# Patient Record
Sex: FEMALE | Race: WHITE | NOT HISPANIC OR LATINO | Employment: FULL TIME | URBAN - METROPOLITAN AREA
[De-identification: names, ages, dates, MRNs, and addresses within clinical notes are randomized per-mention and may not be internally consistent; named-entity substitution may affect disease eponyms.]

---

## 2024-07-18 ENCOUNTER — DOCUMENTATION (OUTPATIENT)
Dept: SURGERY | Facility: CLINIC | Age: 29
End: 2024-07-18

## 2024-07-18 NOTE — PROGRESS NOTES
Initial bariatric nurse interview completed. Discussed with patient program progression/expectations along with program requirements and necessary clearances. Explained to patient they must travel with support person to all visits. Went over initial evaluation visit: labwork, appointment with Dr. Mccollum, appointment with dietician, and appointment with psych. Explained patient will be in area for approximately 2 days for evaluation visit and 9 days surrounding WLS at a local hotel. Patient aware of the $6850 Total OOP maximum, $930 for Initial evaluation visit if OOP not met. Any amount owed must be paid within 10 business days of visits. Patient verbalized understanding to all. All questions answered. Scheduled initial visit for 8/19/2024. Emailed New Patient Handbook including assessment forms & contracts.

## 2024-07-23 PROBLEM — Z01.818 PREOPERATIVE CLEARANCE: Status: ACTIVE | Noted: 2024-07-23

## 2024-07-23 PROBLEM — Z98.84 BARIATRIC SURGERY STATUS: Status: ACTIVE | Noted: 2024-07-23

## 2024-07-23 PROBLEM — E66.01 MORBID OBESITY (MULTI): Status: ACTIVE | Noted: 2024-07-23

## 2024-08-19 ENCOUNTER — APPOINTMENT (OUTPATIENT)
Dept: SURGERY | Facility: CLINIC | Age: 29
End: 2024-08-19
Payer: COMMERCIAL

## 2024-08-19 ENCOUNTER — APPOINTMENT (OUTPATIENT)
Dept: BEHAVIORAL HEALTH | Facility: HOSPITAL | Age: 29
End: 2024-08-19

## 2024-08-20 NOTE — PROGRESS NOTES
Subjective   Date: 9/9/2024 Time: 8:45 AM  Name: Mia Velazquez  MRN: [id number]    This is a 29 y.o. female with morbid obesity, BMI ~ 47 who presents to clinic for consideration of bariatric surgery. she has attempted and failed multiple diet and exercise regimens for weight loss. Initial Onset of obesity was in childhood.  Their goal for surgery is to  be healthier , be candidate for another surgery , lose weight, and in 2-3 years would like to have IVF . The patient has tried multiple diets to lose weight including Crespo, Weight Watchers, Low Calorie, and high protein . The patient was most successful with the Atkins diet. The most pounds lost on this diet were 40 lbs. The patient considers their dietary weakness to be carbs The patient reports a  highest weight ever of 313 pounds and lowest weight ever of 265 poundsThe patient exercises 2-5 times /week  30 minutes/session  Types of Exercise : walking    PREFERRED SURGICAL PROCEDURE: Laparoscopic SLEEVE Gastric Surgery For Morbid Obesity Laparoscopic Longitudinal Gastrectomy     Trying to have pregnancy for 1 year.  Can't do anesthesia for ivf for mi over 40.  Wants to avoid compications.       PMH:   Past Medical History:   Diagnosis Date    Diabetes (Multi)     Epilepsy (Multi)     Nonalcoholic fatty liver disease     PCOS (polycystic ovarian syndrome)     Snores         PSH:   Past Surgical History:   Procedure Laterality Date    CHOLECYSTECTOMY  2016    TONSILLECTOMY  2012    WISDOM TOOTH EXTRACTION  2017        UNK: personal/family hx of VTE.    Grtandfather prediabetic andhigh cholesteroll    GERD - Health Related Quality of Life Questionnaire (GERD- HRQL)    Off PPI for never (how long)    No heartburn or reflux  On ozempic and lost 30 lbs  Notes symptoms if eats wrong things.   Exercise with [employer]: no gym so uses videos provided by [employer]      FAMILY HISTORY:  Family History   Problem Relation Name Age of Onset    Heart attack Maternal Grandfather       Heart disease Maternal Grandfather      Stroke Maternal Grandfather          SOCIAL HISTORY:  Social History     Tobacco Use    Smoking status: Never     Passive exposure: Current (9/9/24:  Pt verifies verbally parents smoked in home growing up, [facility]    Smokeless tobacco: Never    Tobacco comments:     9/9/24:  Pt verifies verbally [facility] LPN   Substance Use Topics    Alcohol use: Not Currently     Comment: 9/9/24:  Pt verifies verbally [facility] LPN    Drug use: Not Currently     Comment: 9/9/24:  Pt verifies verbally [facility] LPN       MEDICATIONS:  Prior to Admission Medications:  Medication Documentation Review Audit       Reviewed by Ayaka Mccollum MD MPH (Physician) on 09/09/24 at 0844      Medication Order Taking? Sig Documenting Provider Last Dose Status     Discontinued 09/09/24 0811     Discontinued 09/09/24 0811   semaglutide (Ozempic) 1 mg/dose (2 mg/1.5 mL) pen injector [id number] Yes Inject 1 mg under the skin 1 (one) time per week. Historical Provider, MD Taking Active                     ALLERGIES:  Allergies   Allergen Reactions    Penicillin Unknown   Ocp as well    REVIEW OF SYSTEMS:  GENERAL: Negative for malaise, significant weight loss and fever  HEAD: Negative for headache, swelling.  NECK: Negative for lumps, goiter, pain and significant neck swelling  RESPIRATORY: Negative for cough, wheezing or shortness of breath.  CARDIOVASCULAR: Negative for chest pain, leg swelling or palpitations.  GI: Negative for abdominal discomfort, blood in stools or black stools or change in bowel habits  : No history of dysuria, frequency or incontinence  MUSCULOSKELETAL: Negative for joint pain or swelling, back pain or muscle pain.  SKIN: Negative for lesions, rash, and itching.  PSYCH: Negative for sleep disturbance, mood disorder and recent psychosocial stressors.  ENDOCRINE: Negative for cold or heat intolerance, polyuria, polydipsia and goiter.    Objective   PHYSICAL EXAM:  Visit  "Vitals  /89   Pulse 86   Resp 18   Ht 1.651 m (5' 5\")   Wt 122 kg (268 lb 4.8 oz)   SpO2 98%   BMI 44.65 kg/m²   Smoking Status Never   BSA 2.37 m²     General appearance: obese, NAD  Neuro: AOx3  Head: EOMI; no swelling or lesions of scalp or face  ENT:  no lumps or lymphadenopathy, thyroid normal to palpation; oropharynx clear, no swelling or erythema  Skin: warm, no erythema or rashes  Lungs: clear to percussion and auscultation  Heart: regular rhythm and S1, S2 normal  Abdomen: soft, non-tender, no masses, no organomegaly  Extremities: Normal exam of the extremities. No swelling or pain.  Psych: no hurried speech, no flight of ideas, normal affect    IMPRESSION:  Mia Velazquez is a 29 y.o. female with a bmi of Body mass index is 44.65 kg/m². with the following diagnoses and co-morbidities: infertility and wants ivf.  She is not sure about sleeve or bypass.  Notes sweets are her weakness and likes ice cream.   She is alreaedy losing wieght with ozempic. She prefers the bypass.  We discussed the procedure at length. Her risk complication is low. Counseled on process to surgery.       We discussed the bypass  at [facility] and all questions were answered. risks and benefits were discussed  The patient understands the risks and benefits of the procedure and how the procedure is performed. The patient understands the risks include but are not limited to bleeding, infection, DVT, PE, pneumonia, myocardial infarction, leak along the staple lines, and weight regain. We discussed lifestyle changes necessary to be successful.      This patient does meet the criteria for a surgical weight loss procedure according to NIH guidelines.  The risks of sleeve gastrectomy, Rayo-en-Y gastric bypass, and duodenal switch surgery including bleeding, leak, wound infection, dehydration, ulcers, internal hernia, DVT/PE, prolonged nausea/vomiting, incomplete resolution of associated medical conditions, reflux, weight regain, " vitamin/mineral deficiencies, and death have been explained to the patient and Mia Velazquez has expressed understanding and acceptance of them.     The increased risk of substance and alcohol abuse following bariatric surgery was discussed with the patient, along with the negative consequences of substance/alcohol use after surgery including addiction, worsening of mental health disorders, and injury to the stomach. The risk of smoking and vaping (tobacco or any other substance) after bariatric surgery was explained to the patient. This includes risk of anastamotic ulcers, gastritis, bleeding, perforation, stricture, and PO intolerance.  The patient expressed understanding and acceptance of these risks.    The benefits of the above surgeries including weight loss, improvement/resolution of associated medical and mental health conditions, improved mobility, and decreased mortality have been explained the the patient and Mia Velazquez has expressed understanding and acceptance of them.      Assessment/Plan   PLAN:  The plan of treatment for Mia Velazquez is to continue with the consultations and tests ordered today in hopes of qualifying for pre-operative clearance for bariatric surgery. This includes: none    Consult Nutrition for education   Consult Psychology  Consult Cardiology  Consult Sleep Medicine - concern for NOHEMY  Labs completed today  Esophagram  Recommend at least 10 lbs of weight loss prior to surgery.  Additional consults/testing: none    PLANNED PROCEDURE: Laparoscopic Laparoscopic Rayo en y Gastric Bypass      The following are some lifestyle changes you should begin to prepare you for your bypass surgery.   Eliminate soda and other carbonated beverages from your diet. Carbonation will not be well tolerated after surgery. Try Propel, Vitamin Water Zero, Sobe Lifewater, Crystal Light or water.    Increase fluid consumption to 64 oz daily. Do not drink within 30 minutes of eating as this will liquefy  your food and make you hungry more quickly.    Exercise for 30-60 minutes daily. Brisk walking, bike riding and swimming are all examples of healthy exercise. If you are unable to exercise we recommend seated exercise.    Do not skip meals.    Take a multivitamin daily.    Lose weight. In preparation for your surgery it is important that you begin making healthier food choices now. Our dietitian will meet with you to help you select foods lower in calories and higher in nutrition. We would like you to lose at least 10  lbs prior to surgery.     Increase your protein intake to 60 grams per day.    Alcohol is empty calories. Please eliminate while preparing for surgery.    Plan your meals.      General Instruction: 1) Use the information we gave you today to work through your insurance requirements and medical clearances.   2) These documents need to get faxed to the program navigators so they can submit them for approval from your insurance company.   3) Obtain labs today at a  facility. We will call you with any abnormalities and corrections you need to make.   4) Continue to work with your primary care doctor and other specialist so your other health problems are well controlled prior to your surgery.   5) Adopt the recommendations of the program dietician so you develop healthy eating patterns.   6) Work with the sleep team to get your sleep apnea treated to prevent other health problems .   7) Consider attending a support group to learn from other who have been through the process.   8) Come to the [facility] sessions.   45 minutes were spent with patient including history, physical exam, and education.

## 2024-09-09 ENCOUNTER — APPOINTMENT (OUTPATIENT)
Dept: SURGERY | Facility: CLINIC | Age: 29
End: 2024-09-09
Payer: COMMERCIAL

## 2024-09-09 ENCOUNTER — OFFICE VISIT (OUTPATIENT)
Dept: BEHAVIORAL HEALTH | Facility: HOSPITAL | Age: 29
End: 2024-09-09
Payer: COMMERCIAL

## 2024-09-09 VITALS
RESPIRATION RATE: 18 BRPM | BODY MASS INDEX: 44.7 KG/M2 | HEIGHT: 65 IN | OXYGEN SATURATION: 98 % | SYSTOLIC BLOOD PRESSURE: 122 MMHG | WEIGHT: 268.3 LBS | HEART RATE: 86 BPM | DIASTOLIC BLOOD PRESSURE: 89 MMHG

## 2024-09-09 VITALS — WEIGHT: 268.96 LBS | HEIGHT: 65 IN | BODY MASS INDEX: 44.81 KG/M2

## 2024-09-09 DIAGNOSIS — Z98.84 BARIATRIC SURGERY STATUS: ICD-10-CM

## 2024-09-09 DIAGNOSIS — Z01.818 PREOPERATIVE CLEARANCE: ICD-10-CM

## 2024-09-09 DIAGNOSIS — F41.8 SITUATIONAL ANXIETY: ICD-10-CM

## 2024-09-09 DIAGNOSIS — E28.2 PCOS (POLYCYSTIC OVARIAN SYNDROME): ICD-10-CM

## 2024-09-09 DIAGNOSIS — E66.01 MORBID OBESITY (MULTI): ICD-10-CM

## 2024-09-09 LAB
25(OH)D3 SERPL-MCNC: 28 NG/ML (ref 30–100)
ALBUMIN SERPL BCP-MCNC: 4.2 G/DL (ref 3.4–5)
ALP SERPL-CCNC: 58 U/L (ref 33–110)
ALT SERPL W P-5'-P-CCNC: 16 U/L (ref 7–45)
AMPHETAMINES UR QL SCN: NORMAL
ANION GAP SERPL CALC-SCNC: 13 MMOL/L (ref 10–20)
APTT PPP: 32 SECONDS (ref 27–38)
AST SERPL W P-5'-P-CCNC: 14 U/L (ref 9–39)
BARBITURATES UR QL SCN: NORMAL
BASOPHILS # BLD AUTO: 0.04 X10*3/UL (ref 0–0.1)
BASOPHILS NFR BLD AUTO: 0.7 %
BENZODIAZ UR QL SCN: NORMAL
BILIRUB SERPL-MCNC: 0.2 MG/DL (ref 0–1.2)
BUN SERPL-MCNC: 15 MG/DL (ref 6–23)
BZE UR QL SCN: NORMAL
CALCIUM SERPL-MCNC: 9.5 MG/DL (ref 8.6–10.3)
CANNABINOIDS UR QL SCN: NORMAL
CHLORIDE SERPL-SCNC: 105 MMOL/L (ref 98–107)
CHOLEST SERPL-MCNC: 169 MG/DL (ref 0–199)
CHOLESTEROL/HDL RATIO: 3.6
CO2 SERPL-SCNC: 26 MMOL/L (ref 21–32)
CREAT SERPL-MCNC: 0.7 MG/DL (ref 0.5–1.05)
EGFRCR SERPLBLD CKD-EPI 2021: >90 ML/MIN/1.73M*2
EOSINOPHIL # BLD AUTO: 0.38 X10*3/UL (ref 0–0.7)
EOSINOPHIL NFR BLD AUTO: 6.3 %
ERYTHROCYTE [DISTWIDTH] IN BLOOD BY AUTOMATED COUNT: 13.2 % (ref 11.5–14.5)
EST. AVERAGE GLUCOSE BLD GHB EST-MCNC: 105 MG/DL
FENTANYL+NORFENTANYL UR QL SCN: NORMAL
FERRITIN SERPL-MCNC: 37 NG/ML (ref 8–150)
FOLATE SERPL-MCNC: 6.9 NG/ML
GLUCOSE SERPL-MCNC: 87 MG/DL (ref 74–99)
HBA1C MFR BLD: 5.3 %
HCT VFR BLD AUTO: 41.9 % (ref 36–46)
HDLC SERPL-MCNC: 46.6 MG/DL
HGB BLD-MCNC: 13.5 G/DL (ref 12–16)
IMM GRANULOCYTES # BLD AUTO: 0.02 X10*3/UL (ref 0–0.7)
IMM GRANULOCYTES NFR BLD AUTO: 0.3 % (ref 0–0.9)
INR PPP: 0.9 (ref 0.9–1.1)
IRON SATN MFR SERPL: 11 % (ref 25–45)
IRON SERPL-MCNC: 48 UG/DL (ref 35–150)
LDLC SERPL CALC-MCNC: 86 MG/DL
LYMPHOCYTES # BLD AUTO: 2.23 X10*3/UL (ref 1.2–4.8)
LYMPHOCYTES NFR BLD AUTO: 36.7 %
MCH RBC QN AUTO: 29.7 PG (ref 26–34)
MCHC RBC AUTO-ENTMCNC: 32.2 G/DL (ref 32–36)
MCV RBC AUTO: 92 FL (ref 80–100)
METHADONE UR QL SCN: NORMAL
MONOCYTES # BLD AUTO: 0.35 X10*3/UL (ref 0.1–1)
MONOCYTES NFR BLD AUTO: 5.8 %
NEUTROPHILS # BLD AUTO: 3.06 X10*3/UL (ref 1.2–7.7)
NEUTROPHILS NFR BLD AUTO: 50.2 %
NON HDL CHOLESTEROL: 122 MG/DL (ref 0–149)
NRBC BLD-RTO: 0 /100 WBCS (ref 0–0)
OPIATES UR QL SCN: NORMAL
OXYCODONE+OXYMORPHONE UR QL SCN: NORMAL
PCP UR QL SCN: NORMAL
PLATELET # BLD AUTO: 384 X10*3/UL (ref 150–450)
POTASSIUM SERPL-SCNC: 4.3 MMOL/L (ref 3.5–5.3)
PROT SERPL-MCNC: 7 G/DL (ref 6.4–8.2)
PROTHROMBIN TIME: 10.5 SECONDS (ref 9.8–12.8)
PTH-INTACT SERPL-MCNC: 44.1 PG/ML (ref 18.5–88)
RBC # BLD AUTO: 4.54 X10*6/UL (ref 4–5.2)
SODIUM SERPL-SCNC: 140 MMOL/L (ref 136–145)
T4 FREE SERPL-MCNC: 0.67 NG/DL (ref 0.61–1.12)
TIBC SERPL-MCNC: 451 UG/DL (ref 240–445)
TRIGL SERPL-MCNC: 180 MG/DL (ref 0–149)
TSH SERPL-ACNC: 2.54 MIU/L (ref 0.44–3.98)
UIBC SERPL-MCNC: 403 UG/DL (ref 110–370)
VIT B12 SERPL-MCNC: 404 PG/ML (ref 211–911)
VLDL: 36 MG/DL (ref 0–40)
WBC # BLD AUTO: 6.1 X10*3/UL (ref 4.4–11.3)

## 2024-09-09 PROCEDURE — 84443 ASSAY THYROID STIM HORMONE: CPT

## 2024-09-09 PROCEDURE — 80307 DRUG TEST PRSMV CHEM ANLYZR: CPT

## 2024-09-09 PROCEDURE — 84425 ASSAY OF VITAMIN B-1: CPT

## 2024-09-09 PROCEDURE — 96136 PSYCL/NRPSYC TST PHY/QHP 1ST: CPT | Performed by: PSYCHOLOGIST

## 2024-09-09 PROCEDURE — 80053 COMPREHEN METABOLIC PANEL: CPT

## 2024-09-09 PROCEDURE — 82728 ASSAY OF FERRITIN: CPT

## 2024-09-09 PROCEDURE — 84439 ASSAY OF FREE THYROXINE: CPT

## 2024-09-09 PROCEDURE — 82746 ASSAY OF FOLIC ACID SERUM: CPT

## 2024-09-09 PROCEDURE — 99205 OFFICE O/P NEW HI 60 MIN: CPT | Performed by: SURGERY

## 2024-09-09 PROCEDURE — 90791 PSYCH DIAGNOSTIC EVALUATION: CPT | Performed by: PSYCHOLOGIST

## 2024-09-09 PROCEDURE — 3008F BODY MASS INDEX DOCD: CPT | Performed by: SURGERY

## 2024-09-09 PROCEDURE — 85730 THROMBOPLASTIN TIME PARTIAL: CPT

## 2024-09-09 PROCEDURE — 36415 COLL VENOUS BLD VENIPUNCTURE: CPT

## 2024-09-09 PROCEDURE — 96130 PSYCL TST EVAL PHYS/QHP 1ST: CPT | Performed by: PSYCHOLOGIST

## 2024-09-09 PROCEDURE — 85025 COMPLETE CBC W/AUTO DIFF WBC: CPT

## 2024-09-09 PROCEDURE — 82525 ASSAY OF COPPER: CPT

## 2024-09-09 PROCEDURE — 83540 ASSAY OF IRON: CPT

## 2024-09-09 PROCEDURE — 82306 VITAMIN D 25 HYDROXY: CPT

## 2024-09-09 PROCEDURE — 1036F TOBACCO NON-USER: CPT | Performed by: PSYCHOLOGIST

## 2024-09-09 PROCEDURE — 96130 PSYCL TST EVAL PHYS/QHP 1ST: CPT | Mod: AH | Performed by: PSYCHOLOGIST

## 2024-09-09 PROCEDURE — 82607 VITAMIN B-12: CPT

## 2024-09-09 PROCEDURE — 83036 HEMOGLOBIN GLYCOSYLATED A1C: CPT

## 2024-09-09 PROCEDURE — 85610 PROTHROMBIN TIME: CPT

## 2024-09-09 PROCEDURE — 83550 IRON BINDING TEST: CPT

## 2024-09-09 PROCEDURE — 83013 H PYLORI (C-13) BREATH: CPT

## 2024-09-09 PROCEDURE — 90791 PSYCH DIAGNOSTIC EVALUATION: CPT | Mod: AH | Performed by: PSYCHOLOGIST

## 2024-09-09 PROCEDURE — 80061 LIPID PANEL: CPT

## 2024-09-09 PROCEDURE — 1036F TOBACCO NON-USER: CPT | Performed by: SURGERY

## 2024-09-09 PROCEDURE — 83970 ASSAY OF PARATHORMONE: CPT

## 2024-09-09 PROCEDURE — 80323 ALKALOIDS NOS: CPT

## 2024-09-09 PROCEDURE — 96136 PSYCL/NRPSYC TST PHY/QHP 1ST: CPT | Mod: AH | Performed by: PSYCHOLOGIST

## 2024-09-09 PROCEDURE — 84630 ASSAY OF ZINC: CPT

## 2024-09-09 ASSESSMENT — ANXIETY QUESTIONNAIRES
6. BECOMING EASILY ANNOYED OR IRRITABLE: SEVERAL DAYS
IF YOU CHECKED OFF ANY PROBLEMS ON THIS QUESTIONNAIRE, HOW DIFFICULT HAVE THESE PROBLEMS MADE IT FOR YOU TO DO YOUR WORK, TAKE CARE OF THINGS AT HOME, OR GET ALONG WITH OTHER PEOPLE: NOT DIFFICULT AT ALL
3. WORRYING TOO MUCH ABOUT DIFFERENT THINGS: NEARLY EVERY DAY
4. TROUBLE RELAXING: NOT AT ALL
GAD7 TOTAL SCORE: 8
5. BEING SO RESTLESS THAT IT IS HARD TO SIT STILL: NOT AT ALL
1. FEELING NERVOUS, ANXIOUS, OR ON EDGE: SEVERAL DAYS
2. NOT BEING ABLE TO STOP OR CONTROL WORRYING: NEARLY EVERY DAY
7. FEELING AFRAID AS IF SOMETHING AWFUL MIGHT HAPPEN: NOT AT ALL

## 2024-09-09 ASSESSMENT — PAIN SCALES - GENERAL: PAINLEVEL: 0-NO PAIN

## 2024-09-09 ASSESSMENT — PATIENT HEALTH QUESTIONNAIRE - PHQ9
SUM OF ALL RESPONSES TO PHQ9 QUESTIONS 1 & 2: 0
1. LITTLE INTEREST OR PLEASURE IN DOING THINGS: NOT AT ALL
2. FEELING DOWN, DEPRESSED OR HOPELESS: NOT AT ALL

## 2024-09-09 ASSESSMENT — LIFESTYLE VARIABLES
HOW OFTEN DO YOU HAVE A DRINK CONTAINING ALCOHOL: NEVER
HOW MANY STANDARD DRINKS CONTAINING ALCOHOL DO YOU HAVE ON A TYPICAL DAY: PATIENT DOES NOT DRINK
HOW OFTEN DO YOU HAVE SIX OR MORE DRINKS ON ONE OCCASION: NEVER
AUDIT-C TOTAL SCORE: 0
AUDIT-C TOTAL SCORE: 0
SKIP TO QUESTIONS 9-10: 1

## 2024-09-09 NOTE — PROGRESS NOTES
"Initial Bariatric Nutrition Assessment    Surgeon:   Chun  Patient is considering: sleeve gastrectomy     ASSESSMENT:  Current weight:   Vitals:    09/09/24 1115   Weight: 122 kg (268 lb 15.4 oz)     Ht:  1.651 m (5' 5\")   BMI:  Body mass index is 44.76 kg/m².        Initial start weight:   280lbs  Pre-Op Excess Body Weight (EBW):   130lbs    Target Post-Op weight goal: 195.5-215lbs        This is a 29 y.o. female with morbid obesity (Body mass index is 44.76 kg/m².) who presents to clinic for consideration of bariatric surgery. she has attempted and failed multiple diet and exercise regimens for weight loss.    Initial Onset of obesity was in childhood.  Their goal for surgery is to  be healthier , be candidate for another surgery , lose weight, and in 2-3 years would like to have IVF . The patient has tried multiple diets to lose weight including Crespo, Weight Watchers, Low Calorie, and high protein . The patient was most successful with the Atkins diet. The most pounds lost on this diet were 40 lbs. The patient considers their dietary weakness to be carbs The patient reports a  highest weight ever of 313 pounds and lowest weight ever of 265 pounds.    Current diet: high protein, high veggies, low carb   The patient does home workout videos (cardio, Burn Along) 4x per week for 30-45 minutes for exercise.     Food allergies/intolerances:   no  Chewing/Swallowing/Dentition: no  Nausea / Vomiting / Hx Gastroparesis:  no  Diarrhea/ Constipation: w/greasy foods  Smoking/Tobacco use: no  Vitamins/Minerals supplements: prenatals   Hours of sleep/night: 6-8 hours     Medications:     Current Outpatient Medications:     semaglutide (Ozempic) 1 mg/dose (2 mg/1.5 mL) pen injector, Inject 1 mg under the skin 1 (one) time per week., Disp: , Rfl:       24 HOUR RECALL/DIET HISTORY:  Breakfast:  protein shake (50g)  Snack:    Lunch: skip  Snack:   Dinner: slices turkey (6 slices)  Snack:   Beverages: skim milk, water " (40-80oz)  Alcohol: no-never    Person responsible for cooking & shopping?   self  How often do you eat sweet snacks?   rarely  How often do you eat savory snacks?  never  How often do you eat out?   rarely  Do you feel overly stuffed?   no  Binge Eating?  no  Night Eating?  no  Emotional Eating?  no       READINESS TO LEARN:  Motivation to learn: Interested        Understanding of instruction: Good     Anticipated Compliance: Good       Family Support: yes           Educational Materials Provided:    Goals sheet    Nutrition assessment completed today.  Pt will be scheduled for video education class to discuss the 2 week pre op diet, post op protein and fluid goals, vitamin and mineral supplementation, exercise goals, and post op diet progression closer to the time of surgery    Patient is seeking sleeve gastrectomy    Instructed pt to eat 3 meals per and 1-2 high protein snacks.  Recommend eating 3-4 oz per meal. Reviewed the postop behaviors to start practicing.  Set a goal to maintain current exercise.     Patient was receptive to nutritional recommendations, asked numerous questions, and verbalized understanding of the weight loss surgery diet.  Patient expressed understanding about the importance of strict dietary compliance post-surgery to avoid nutritional deficiencies and achieve optimal weight loss and verbalized intent to follow dietary recommendations.    Malnutrition Screening:   Significant unintentional weight loss? n/a   Eating less than 75% of usual intake for more than 2 weeks? n/a      Nutrition Diagnosis:   Overweight/obesity related to excess energy intake as evidenced by BMI >= 40 kg/m^2.  Food- and nutrition-related knowledge deficit related to lack of prior exposure to surgical weight loss information as evidenced by pt new to surgical program.    Nutrition Interventions:   Modify type and amount of food and nutrients within meals and snacks.  Comprehensive Nutrition  Education    Recommendations:  1. Consider tracking your intake in the [employer] [place]anna.   Begin following your meal plan.  Measure and record intake daily.   2. Structure meal patterns, eating three meals and 1-2 snacks per day.  3. Aim for 3-4 oz (20g) protein per meal.  Have 1-2 high protein snacks that are 10-20 g protein each.  You can try a tuna or chicken packet, Greek yogurt, 2 string cheeses, Protein bars like Quest, Pure Protein, Premier, or Built Bars. you can also try protein chips form Quest or Atkins.    4. Drink 64oz of calorie-free, caffeine-free, and non-carbonated beverages. Practice taking sips and avoid straws.   5. Practice no drinking 30 minutes before meals, nothing with meals and wait 30 minutes after meals to drink again. Make meals last 30 minutes-chew thoroughly.   6. Limit or omit eating out/sweets/savory snacks to 1-2 times per week.  7. Continue daily multivitamin.   8. Continue home video exercises. Increase physical activity by 10-15 minutes as tolerated to an end goal of 60 minutes 5 x per week. Consistency is the key.    Pre-op Goal weight: lose 5% of body weight    Nutrition Monitoring and Evaluation: 1-2 pound weight loss per week  Criteria: weight check  Need for Follow-up: one month phone call check in    Patient does meet National Institutes Health guidelines for weight loss surgery, however needs to demonstrate consistent effort in making dietary changes before giving clearance. It is anticipated that the patient will need at least 1 nutritional follow-up visits prior to clearance for surgery.      Rhiannon Spicer MS, RD, LD  Phone: [phone]

## 2024-09-09 NOTE — PATIENT INSTRUCTIONS
PLAN:  The plan of treatment for Mia Velazquez is to continue with the consultations and tests ordered today in hopes of qualifying for pre-operative clearance for bariatric surgery. This includes: none    Consult Nutrition for education   Consult Psychology  Consult Cardiology  Consult Sleep Medicine - concern for NOHEMY  Labs completed today  Esophagram  Recommend at least 10 lbs of weight loss prior to surgery.  Additional consults/testing: none    PLANNED PROCEDURE: Laparoscopic Laparoscopic Rayo en y Gastric Bypass      The following are some lifestyle changes you should begin to prepare you for your bypass surgery.   Eliminate soda and other carbonated beverages from your diet. Carbonation will not be well tolerated after surgery. Try Propel, Vitamin Water Zero, Sobe Lifewater, Crystal Light or water.    Increase fluid consumption to 64 oz daily. Do not drink within 30 minutes of eating as this will liquefy your food and make you hungry more quickly.    Exercise for 30-60 minutes daily. Brisk walking, bike riding and swimming are all examples of healthy exercise. If you are unable to exercise we recommend seated exercise.    Do not skip meals.    Take a multivitamin daily.    Lose weight. In preparation for your surgery it is important that you begin making healthier food choices now. Our dietitian will meet with you to help you select foods lower in calories and higher in nutrition. We would like you to lose at least 10  lbs prior to surgery.     Increase your protein intake to 60 grams per day.    Alcohol is empty calories. Please eliminate while preparing for surgery.    Plan your meals.      General Instruction: 1) Use the information we gave you today to work through your insurance requirements and medical clearances.   2) These documents need to get faxed to the program navigators so they can submit them for approval from your insurance company.   3) Obtain labs today at a  facility. We will call you  with any abnormalities and corrections you need to make.   4) Continue to work with your primary care doctor and other specialist so your other health problems are well controlled prior to your surgery.   5) Adopt the recommendations of the program dietician so you develop healthy eating patterns.   6) Work with the sleep team to get your sleep apnea treated to prevent other health problems .   7) Consider attending a support group to learn from other who have been through the process.   8) Come to the [facility] sessions.

## 2024-09-09 NOTE — PROGRESS NOTES
"Time started: 9:30  Time ended: 10:22  Total time spent: 52 minutes  Visit type: in-person   Other time spent: 30 minutes report writing (integrating data, scoring and interpreting psych assessments, and plan for clearance).     Disclaimer: We discussed that the note will be visible and others healthcare practitioners will have access. The patient has consented to an unrestricted note.   Metabolic Bariatric Surgery: Behavioral Health Evaluation:   Referral: Bariatric Surgery Department, Travel [facility] program.  Chief Complaint: Psychiatric Evaluation (Metabolic bariatric surgery)   Her friend: Rachana was present.     Brief Background:   Mia Velazquez is a  29 y.o. year-old,  single with a partner , White or  female. The patient was born in [place] and raised by her mother and maternal grandparents and stepfather off/on. The patient has 2, soon to be stepsons. The patient has an older sibling and a paternal half sibling. She lives in a house with her partner and 2 step-children and feels safe. She resides in [place], [place].   Employment: [employer] as a    Education: college student studying business.     Weight history:  The patient started having problems with excess weight when She was 11 years old.  Highest adult weight: 313 lbs in July 2023.   Lowest adult weight: 226 lbs.  Current weight: 268 lbs  Height: 5'4\"  Diets tried: Atkins, Low carb, high protein, and Ozempic. Currently, she is taking Ozempic.   Patient had the most success with Atkins while in high school, losing 40 pounds.     Surgeon, procedure and risks/benefits from a behavioral health perspective:   Dr. Mccollum is the patient's surgeon. They agreed to the gastric bypass procedure.  The medical risks and benefits were discussed with the patient's surgeon. Risks/benefits of the surgery from a behavioral health perspective were reviewed.    Motivation and weight loss goal:   Currently, how does your excess weight affect your life? \"Not being able to " "get pregnant.\" She stated she cannot go through IVF due to her BMI.   The patient's motivation for surgery includes: To be able to get pregnant. She wants to improve her health and quality of life.   The patient expects to lose 70 pounds 12-18 months postsurgery.   Reviewed the risks of pregnancy prior to 18 months, postsurgery. Patient stated she would wait for 2 years before trying to get pregnant.     Support system:   Mia Velazquez's friend, Rachana will be the patient's support system after the surgery.   Family/friends supportive: yes  Family/friends have concerns that need to be addressed: yes and her concerns about the healing process and pain were briefly discussed.   Stress and Coping:   Psychosocial stressors: \"child custody, college, finances\"  Coping mechanisms include: setting boundaries with others. She tries to stay focused and enjoy her family.   Coping skills were rated as effective.    The timing for surgery:   The patient can take time off work: yes  Any reasons to delay the surgery?  no, denied    Genetic and medical conditions and/or medications contributing to excess weight:  Genetics: yes, maternal grandfather.  Medications: no  Medical conditions: yes, PCOS and insulin resistance  Family history of obesity-related or other medical conditions: maternal grandfather (heart disease and high cholesterol and kidney failure). Mother is a smoker and has HTN.   Father passed away from a drug overdose (addiction to pain medications and cannabis) in 2007. He was between 34-36 years old.   Patient's obesity-related or other medical conditions: PCOS and nonalcoholic fatty liver disease. 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   Factors that led to weight gain or weight regain include: In the past, eating sweets and not watching the types of foods she eats. Dining at fast food restaurants in the past.     Specifically for women:   Hormonal:   Weight changes after pregnancies: " N/A  Infertility treatments: yes.  If yes, any weight changes? no  Any noticeable changes in weight or weight distribution during neal-or post-menopause: N/A.    Current, Behavioral and/or eating disorders contributing to excess weight:   The patient eats more than intended or planned in response to: Other. She is not eating in response to stress or emotions due to being on Ozempic.   Food weaknesses include: sweets, Oreos.     Disordered eating History:   The patient denied a history of an eating disorder.   If yes, then has the patient received treatment? Not applicable  Where and when was treatment? N/A    Current eating disorder assessment:  Compensatory behaviors: The patient reported denied compensatory behaviors    Binge Eating Disorder symptoms: Does not meet criteria  Night Eating Syndrome: DCnighteatingsyndrome: The patient does not meet criteria for night eating syndrome    Graze Eating Habits: The patient reported DCgrazeeating: does not meet criteria for problematic graze eating habits  Sleep-Disordered eating: no  The patient reported the following problems with body image: DCbodyimage: denied    The patient does not meet criteria for an eating disorder.     Adherence:  The patient is  working with a  registered dietician in the [facility].   The patient's pre-surgery weight loss goal is: 10 lbs   Current exercise habits: She uses an geovanna through [employer]. It is an at home exercise geovanna. She is exercising 3-4x per week for 30 minutes.    Current eating habits:  The patient consumes 2 meals, 0-1 snacks, and consumes 60-80 ounces of water per day.   The patient has made the following behavioral changes. She stated that she cannot eat greasy foods or fast foods due to taking the Ozempic. She has been eating less carbs and more protein.     The patient has not met with sleep medicine.   The patient takes her medications as prescribed   Are there any barriers to exercise or  "changing your eating habits? no    Mental health history:     Currently, the patient is not in treatment for mental health conditions.   Learning disorders (reading or reading comprehension): denied     The patient denied a history of:  Depression , Bipolar Disorder, Anxiety Disorder, Panic Attacks/Disorder, Social Anxiety, Obsessive Compulsive Disorder, Disorder of Thought , Personality Disorder, PTSD, and ADD  Hospitalizations for mental health: Denied  Suicide attempts: Denied  Self-injurious behaviors: Denied  Insomnia: Denied  History of problems with impulse control: Denied  Cognitive (memory problems and/or forgetfulness): Denied  Any mental health problems related to past surgeries? Yes when she  had her wisdom teeth removed it was anxiety provoking when she \"vomited\" up blood.     Substance, tobacco, and alcohol use history:    History of alcohol dependence: Denied  Substance dependence: Denied  Education was provided about alcohol and substance use and postsurgery: yes  Dependence on prescription medications: Denied  OTC Pain medications include: Tylenol   Education was provided about using only acetaminophen: yes  Tobacco dependence: Denied  Currently smoking: denied   Treatment programs for addiction: Denied    Please see AUDIT for alcohol use habits over the past year.   Current Tobacco use habits include:    Tobacco Use: Medium Risk (9/9/2024)    Patient History     Smoking Tobacco Use: Never     Smokeless Tobacco Use: Never     Passive Exposure: Current        Current Cannabis use:   CBD products, such as lotions and oils: Denied  Cannabis edibles: Denied  medical marijuana: Denied  Cannabis from dispensaries: Denied   Street weed:  Denied  If street weed: who is your supplier?   THC vape pen: Denied  Hookah with nicotine or marijuana leaves: Denied  Last time used cannabis in any form? never    Other illegal or illicit drug use? Denied   Last time used other illegal or illicit drugs? never    Mental " "Status Evaluation  General Appearance: well groomed, appropriate eye contact  Attitude/Behavior: cooperative  Motor: no psychomotor agitation or retardation, no tremor or other abnormal movements  Speech: normal rate, volume, prosody  Gait/Station: WFL  Mood: normal   Affect: euthymic, full-range  Thought Process: linear, goal directed  Thought Associations: no loosening of associations  Thought Content: normal  Perception: no perceptual abnormalities noted  Sensorium: alert and oriented to person, place, time and situation  Insight: intact  Judgment: intact  Cognition: cognitively intact to conversational testing with respect to attention, orientation, fund of knowledge, recent and remote memory, and language     The patient's mood today was described as \"I'm okay.\"  During today's evaluation, the patient deniedsuicidal ideation, plan, and/or attempt.      Summary:   Mia Velazquez   is a  29 y.o. year-old,  single, but living with partner , White or  female who presents today with a history of obesity with comorbid medical conditions and difficulty with weight loss. The purpose of this evaluation was to conduct a behavioral health evaluation for metabolic bariatric surgery to determine if She is an appropriate candidate for weight loss surgery from a behavioral health perspective.    Eating Habits Checklist = 5. This score falls in the range of minimal binge eating habits. There were no notable responses.   Alcohol Use Identification Test-C (AUDIT-C) = 0. She stated she has not consumed alcohol for over a year.   Generalized anxiety disorder questionnaire-7 (DON-7) = 8, mild situational anxiety regarding custody of her partner's kids.   Patient Health Questionnaire -9 (PHQ-9) = 0. This is a negative screen for depression.     Clinical Summary:     Adherence Problems:  patient has made health behavior changes.   Motivation: she is motivated to make lifestyle changes for the weight loss surgery and weight " loss maintenance.   Coping Skills: were rated as effective.   Resources for support: her partner and best friend.   Psychological Stability or Contradictions: there are no known contraindications.   Using illicit or illegal substances: Denied  Using tobacco/nicotine:  Denied  Problems with alcohol use based on AUDIT score: no  Seeing a practitioner to treat mental health or substance use disorders: Denied  Cleared for Surgery:  yes    Post-Surgery Recommendations: 1:1 follow up with Psychology at 1, 3, 6 and 12 months Postsurgery. Postsurgery education and support groups in her area.         Filomena Hahn, PhD

## 2024-09-10 ENCOUNTER — APPOINTMENT (OUTPATIENT)
Dept: SURGERY | Facility: CLINIC | Age: 29
End: 2024-09-10
Payer: COMMERCIAL

## 2024-09-10 LAB — UREA BREATH TEST QL: NEGATIVE

## 2024-09-11 LAB
COPPER SERPL-MCNC: 192.8 UG/DL (ref 80–155)
ZINC SERPL-MCNC: 69.9 UG/DL (ref 60–120)

## 2024-09-12 LAB
COTININE SERPL-MCNC: <5 NG/ML
NICOTINE SERPL-MCNC: <5 NG/ML

## 2024-09-13 LAB — VIT B1 PYROPHOSHATE BLD-SCNC: 126 NMOL/L (ref 70–180)

## 2024-10-08 ENCOUNTER — TELEPHONE (OUTPATIENT)
Dept: SURGERY | Facility: CLINIC | Age: 29
End: 2024-10-08
Payer: COMMERCIAL

## 2024-10-08 NOTE — TELEPHONE ENCOUNTER
Phone call travel check in.     Weight: 257lbs     The pt has started to have 3 meals a day with protein. Working on eating slowly and stopping when she is satisfied. Continues to drink water and meeting 64oz daily. Working on the 30s rule. Continues to take the prenatal vitamins.     Diet recall:  B: protein shake   L: sardines or HB eggs or lunch meat or wings   D: roast and and veggies  S: greek yogurt or cheese stick or jerky     Exercise: home workout videos (cardio, Burn Along) 5x per week for 30-45 minutes     The pt is doing well and meeting her goals.Is cleared from a nutrition standpoint. Will call the pt to review the 2 week pre op diet once surgery date is set.      Rhiannon Spicer MS, RD, LD  Phone: [phone]

## 2024-10-14 ENCOUNTER — APPOINTMENT (OUTPATIENT)
Dept: SURGERY | Facility: CLINIC | Age: 29
End: 2024-10-14
Payer: COMMERCIAL

## 2024-10-22 DIAGNOSIS — Z98.84 BARIATRIC SURGERY STATUS: Primary | ICD-10-CM

## 2024-10-22 DIAGNOSIS — Z01.818 PREOPERATIVE CLEARANCE: ICD-10-CM

## 2024-10-22 NOTE — PROGRESS NOTES
Good Morning,     I received your cardiac clearance letter this morning; however they did not send the office notes.  Is this something you can get to me?      In the meantime, I will submit your chart to scheduling! Woohoo!     Please keep making healthy lifestyle changes so you set yourself up to be the most successful after surgery. Dr. Mccollum will not be sad if you lose more than the 10# required for surgery ??.     Lastly, once you have a date for surgery, you will receive a formal letter detailing what you will need to do before you travel to [place]. For instance, you will need to have a preop exam with your primary care within 30 days of surgery and a urinalysis at that appointment. You will see some [facility]; these will be completed at your preadmission testing appointment and you can disregard the instructions to print and have completed.     Congratulations!  Talk with you soon!          Good Afternoon,     [facility] patient Mia Velazquez  1995 has completed all preoperative requirements and is ready to schedule her laparoscopic gastric bypass with Dr. Mccollum.    POC Info:  She is driving with her friend, Shreyas Morin.  BMI 44.76. No CPAP.    Thank you!

## 2024-10-24 ENCOUNTER — TELEPHONE (OUTPATIENT)
Dept: SURGERY | Facility: CLINIC | Age: 29
End: 2024-10-24
Payer: COMMERCIAL

## 2024-10-24 NOTE — TELEPHONE ENCOUNTER
Reviewed pre-op diet, post-op diet stages, and post-op supplement regimen.  All questions answered.  Emailed nutrition handouts.

## 2024-11-21 NOTE — PROGRESS NOTES
11/21/2024    Mia Velazquez attended the mandatory bariatric preop class. All questions answered.

## 2024-12-02 DIAGNOSIS — Z98.84 BARIATRIC SURGERY STATUS: Primary | ICD-10-CM

## 2024-12-02 RX ORDER — OMEPRAZOLE 40 MG/1
40 CAPSULE, DELAYED RELEASE ORAL
Qty: 30 CAPSULE | Refills: 1 | Status: SHIPPED | OUTPATIENT
Start: 2024-12-02

## 2024-12-02 RX ORDER — OXYCODONE HCL 5 MG/5 ML
5 SOLUTION, ORAL ORAL EVERY 6 HOURS PRN
Qty: 150 ML | Refills: 0 | Status: SHIPPED | OUTPATIENT
Start: 2024-12-02 | End: 2024-12-07

## 2024-12-02 RX ORDER — OMEPRAZOLE 40 MG/1
40 CAPSULE, DELAYED RELEASE ORAL
Qty: 30 CAPSULE | Refills: 1 | Status: SHIPPED | OUTPATIENT
Start: 2024-12-02 | End: 2024-12-02

## 2024-12-02 RX ORDER — ONDANSETRON 4 MG/1
4 TABLET, ORALLY DISINTEGRATING ORAL EVERY 8 HOURS PRN
Qty: 90 TABLET | Refills: 0 | Status: SHIPPED | OUTPATIENT
Start: 2024-12-02 | End: 2025-01-01

## 2024-12-04 ENCOUNTER — ANESTHESIA EVENT (OUTPATIENT)
Dept: OPERATING ROOM | Facility: HOSPITAL | Age: 29
End: 2024-12-04
Payer: COMMERCIAL

## 2024-12-04 ENCOUNTER — PRE-ADMISSION TESTING (OUTPATIENT)
Dept: PREADMISSION TESTING | Facility: HOSPITAL | Age: 29
End: 2024-12-04
Payer: COMMERCIAL

## 2024-12-04 VITALS
TEMPERATURE: 97.3 F | HEART RATE: 96 BPM | WEIGHT: 246.91 LBS | OXYGEN SATURATION: 96 % | RESPIRATION RATE: 16 BRPM | SYSTOLIC BLOOD PRESSURE: 109 MMHG | DIASTOLIC BLOOD PRESSURE: 76 MMHG | BODY MASS INDEX: 41.14 KG/M2 | HEIGHT: 65 IN

## 2024-12-04 DIAGNOSIS — Z98.84 BARIATRIC SURGERY STATUS: ICD-10-CM

## 2024-12-04 DIAGNOSIS — Z01.818 PREOPERATIVE CLEARANCE: ICD-10-CM

## 2024-12-04 PROBLEM — E66.01 MORBID (SEVERE) OBESITY DUE TO EXCESS CALORIES (MULTI): Status: ACTIVE | Noted: 2024-12-04

## 2024-12-04 LAB
ABO GROUP (TYPE) IN BLOOD: NORMAL
ALBUMIN SERPL BCP-MCNC: 4.2 G/DL (ref 3.4–5)
ALP SERPL-CCNC: 52 U/L (ref 33–110)
ALT SERPL W P-5'-P-CCNC: 55 U/L (ref 7–45)
AMORPH CRY #/AREA UR COMP ASSIST: ABNORMAL /HPF
ANION GAP SERPL CALC-SCNC: 14 MMOL/L (ref 10–20)
ANTIBODY SCREEN: NORMAL
APPEARANCE UR: ABNORMAL
AST SERPL W P-5'-P-CCNC: 18 U/L (ref 9–39)
BASOPHILS # BLD AUTO: 0.03 X10*3/UL (ref 0–0.1)
BASOPHILS NFR BLD AUTO: 0.7 %
BILIRUB SERPL-MCNC: 0.3 MG/DL (ref 0–1.2)
BILIRUB UR STRIP.AUTO-MCNC: NEGATIVE MG/DL
BUN SERPL-MCNC: 20 MG/DL (ref 6–23)
CALCIUM SERPL-MCNC: 9.5 MG/DL (ref 8.6–10.3)
CHLORIDE SERPL-SCNC: 104 MMOL/L (ref 98–107)
CO2 SERPL-SCNC: 24 MMOL/L (ref 21–32)
COLOR UR: YELLOW
CREAT SERPL-MCNC: 0.74 MG/DL (ref 0.5–1.05)
EGFRCR SERPLBLD CKD-EPI 2021: >90 ML/MIN/1.73M*2
EOSINOPHIL # BLD AUTO: 0.15 X10*3/UL (ref 0–0.7)
EOSINOPHIL NFR BLD AUTO: 3.4 %
ERYTHROCYTE [DISTWIDTH] IN BLOOD BY AUTOMATED COUNT: 12.8 % (ref 11.5–14.5)
GLUCOSE SERPL-MCNC: 82 MG/DL (ref 74–99)
GLUCOSE UR STRIP.AUTO-MCNC: NORMAL MG/DL
HCT VFR BLD AUTO: 40.1 % (ref 36–46)
HGB BLD-MCNC: 13.2 G/DL (ref 12–16)
HOLD SPECIMEN: NORMAL
IMM GRANULOCYTES # BLD AUTO: 0.01 X10*3/UL (ref 0–0.7)
IMM GRANULOCYTES NFR BLD AUTO: 0.2 % (ref 0–0.9)
INR PPP: 1 (ref 0.9–1.1)
KETONES UR STRIP.AUTO-MCNC: ABNORMAL MG/DL
LEUKOCYTE ESTERASE UR QL STRIP.AUTO: ABNORMAL
LYMPHOCYTES # BLD AUTO: 1.91 X10*3/UL (ref 1.2–4.8)
LYMPHOCYTES NFR BLD AUTO: 43.4 %
MCH RBC QN AUTO: 29.7 PG (ref 26–34)
MCHC RBC AUTO-ENTMCNC: 32.9 G/DL (ref 32–36)
MCV RBC AUTO: 90 FL (ref 80–100)
MONOCYTES # BLD AUTO: 0.39 X10*3/UL (ref 0.1–1)
MONOCYTES NFR BLD AUTO: 8.9 %
MUCOUS THREADS #/AREA URNS AUTO: ABNORMAL /LPF
NEUTROPHILS # BLD AUTO: 1.91 X10*3/UL (ref 1.2–7.7)
NEUTROPHILS NFR BLD AUTO: 43.4 %
NITRITE UR QL STRIP.AUTO: NEGATIVE
NRBC BLD-RTO: 0 /100 WBCS (ref 0–0)
PH UR STRIP.AUTO: 6 [PH]
PLATELET # BLD AUTO: 330 X10*3/UL (ref 150–450)
POTASSIUM SERPL-SCNC: 4.3 MMOL/L (ref 3.5–5.3)
PROT SERPL-MCNC: 7.4 G/DL (ref 6.4–8.2)
PROT UR STRIP.AUTO-MCNC: ABNORMAL MG/DL
PROTHROMBIN TIME: 11.5 SECONDS (ref 9.8–12.8)
RBC # BLD AUTO: 4.45 X10*6/UL (ref 4–5.2)
RBC # UR STRIP.AUTO: ABNORMAL /UL
RBC #/AREA URNS AUTO: ABNORMAL /HPF
RH FACTOR (ANTIGEN D): NORMAL
SODIUM SERPL-SCNC: 138 MMOL/L (ref 136–145)
SP GR UR STRIP.AUTO: 1.03
SQUAMOUS #/AREA URNS AUTO: ABNORMAL /HPF
UROBILINOGEN UR STRIP.AUTO-MCNC: NORMAL MG/DL
WBC # BLD AUTO: 4.4 X10*3/UL (ref 4.4–11.3)
WBC #/AREA URNS AUTO: ABNORMAL /HPF
WBC CLUMPS #/AREA URNS AUTO: ABNORMAL /HPF

## 2024-12-04 PROCEDURE — 81001 URINALYSIS AUTO W/SCOPE: CPT

## 2024-12-04 PROCEDURE — 85025 COMPLETE CBC W/AUTO DIFF WBC: CPT

## 2024-12-04 PROCEDURE — 80323 ALKALOIDS NOS: CPT

## 2024-12-04 PROCEDURE — 84075 ASSAY ALKALINE PHOSPHATASE: CPT

## 2024-12-04 PROCEDURE — 87077 CULTURE AEROBIC IDENTIFY: CPT | Mod: GEALAB

## 2024-12-04 PROCEDURE — 85610 PROTHROMBIN TIME: CPT

## 2024-12-04 PROCEDURE — 86901 BLOOD TYPING SEROLOGIC RH(D): CPT

## 2024-12-04 PROCEDURE — 36415 COLL VENOUS BLD VENIPUNCTURE: CPT

## 2024-12-04 RX ORDER — NORGESTIMATE AND ETHINYL ESTRADIOL 0.25-0.035
1 KIT ORAL DAILY
COMMUNITY

## 2024-12-04 ASSESSMENT — PAIN - FUNCTIONAL ASSESSMENT: PAIN_FUNCTIONAL_ASSESSMENT: 0-10

## 2024-12-04 ASSESSMENT — DUKE ACTIVITY SCORE INDEX (DASI)
CAN YOU WALK INDOORS, SUCH AS AROUND YOUR HOUSE: YES
CAN YOU WALK A BLOCK OR TWO ON LEVEL GROUND: YES
CAN YOU DO YARD WORK LIKE RAKING LEAVES, WEEDING OR PUSHING A MOWER: YES
CAN YOU PARTICIPATE IN STRENOUS SPORTS LIKE SWIMMING, SINGLES TENNIS, FOOTBALL, BASKETBALL, OR SKIING: YES
CAN YOU DO LIGHT WORK AROUND THE HOUSE LIKE DUSTING OR WASHING DISHES: YES
TOTAL_SCORE: 58.2
CAN YOU PARTICIPATE IN MODERATE RECREATIONAL ACTIVITIES LIKE GOLF, BOWLING, DANCING, DOUBLES TENNIS OR THROWING A BASEBALL OR FOOTBALL: YES
CAN YOU DO HEAVY WORK AROUND THE HOUSE LIKE SCRUBBING FLOORS OR LIFTING AND MOVING HEAVY FURNITURE: YES
CAN YOU RUN A SHORT DISTANCE: YES
CAN YOU DO MODERATE WORK AROUND THE HOUSE LIKE VACUUMING, SWEEPING FLOORS OR CARRYING GROCERIES: YES
CAN YOU CLIMB A FLIGHT OF STAIRS OR WALK UP A HILL: YES
CAN YOU TAKE CARE OF YOURSELF (EAT, DRESS, BATHE, OR USE TOILET): YES
DASI METS SCORE: 9.9
CAN YOU HAVE SEXUAL RELATIONS: YES

## 2024-12-04 ASSESSMENT — PAIN SCALES - GENERAL: PAINLEVEL_OUTOF10: 0 - NO PAIN

## 2024-12-04 ASSESSMENT — ACTIVITIES OF DAILY LIVING (ADL): ADL_SCORE: 0

## 2024-12-04 NOTE — H&P
History Of Present Illness  Mia Velazquez is a 29 y.o. female presenting with morbid obesity with a BMI of 41.09.  Patient has had unsuccessful weight loss with lifestyle modification and dietary adjustments, she successfully completed all requirements for clearance, and presents today for a laparoscopic Rayo-en-Y gastric bypass.     Past Medical History  Past Medical History:   Diagnosis Date    Delayed emergence from general anesthesia     Epilepsy     pseudo  since age 16. 1st was aftyer wisdom teeth anesthesia,, last seizure jan 7,2024    Nonalcoholic fatty liver disease     PCOS (polycystic ovarian syndrome)     Snores        Surgical History  Past Surgical History:   Procedure Laterality Date    CHOLECYSTECTOMY  2016    TONSILLECTOMY  2012    WISDOM TOOTH EXTRACTION  2017        Social History  She reports that she has never smoked. She has been exposed to tobacco smoke. She has never used smokeless tobacco. She reports that she does not currently use alcohol. She reports that she does not use drugs.    Family History  Family History   Problem Relation Name Age of Onset    Heart attack Maternal Grandfather      Heart disease Maternal Grandfather      Stroke Maternal Grandfather          Allergies  Penicillin    Review of Systems  Constitutional: Negative  Gastrointestinal: Negative  Respiratory: Negative  Cardiovascular: Negative  Skin: Negative  Musculoskeletal: Negative  Neurological: Negative  Endocrine: Negative  Behavioral: Negative    Physical Exam  Patient is in no acute distress  No respiratory distress  Chest is clear to auscultation  Abdomen is soft, not tender, not distended  No guarding   Patient is alert and oriented x 3    Last Recorded Vitals  There were no vitals taken for this visit.    Relevant Results      Assessment/Plan   This is a 29-year-old female with morbid obesity and comorbidities.  She has satisfied all requirements for clearance, and presents today for a laparoscopic Rayo-en-Y  gastric bypass creation    Plan:  - Laparoscopic Rayo-en-Y gastric bypass, possible hiatal hernia repair, possible open    I spent 30 minutes in the professional and overall care of this patient.    Boris Posada MD

## 2024-12-04 NOTE — PREPROCEDURE INSTRUCTIONS
Medication List            Accurate as of December 4, 2024  8:06 AM. Always use your most recent med list.                omeprazole 40 mg DR capsule  Commonly known as: PriLOSEC  Take 1 capsule (40 mg) by mouth once daily in the morning. Take before meals. Please remove capsule and use granules and mix with sugar free pudding or jello. Post-op medication     ondansetron ODT 4 mg disintegrating tablet  Commonly known as: Zofran-ODT  Dissolve 1 tablet (4 mg) in the mouth every 8 hours if needed for nausea or vomiting. Post-operative medication     oxyCODONE 5 mg/5 mL solution  Commonly known as: Roxicodone  Take 5 mL (5 mg) by mouth every 6 hours if needed for severe pain (7 - 10) for up to 5 days. Post-operative medication     Ozempic 1 mg/dose (2 mg/1.5 mL) pen injector  Generic drug: semaglutide     Sprintec (28) 0.25-35 mg-mcg tablet  Generic drug: norgestimate-ethinyl estradioL  Medication Adjustments for Surgery: Take on the morning of surgery              SURGERY PRE-OPERATIVE INSTRUCTIONS    *You will receive a phone call the day before your procedure  after 2pm, (or the Friday before your surgery if scheduled on a Monday.) Generally the hospital will be calling you with this information after that time.    *You are not to eat after midnight the night before the surgery. You may have up to 13 ounces of clear liquids up until 2 hours prior to arriving to the hospital. The exception is with medications you were instructed to take day of surgery.    *You may take tylenol for pain/discomfort as needed.     *Stop taking all aspirin products, ibuprofen (motrin/advil), naproxen (aleve/naprosyn) for one week prior to surgery.    *Stop taking all vitamins and supplements one week prior to surgery.     *You should not have alcoholic beverages for 24 hours before surgery.     *You should not smoke 24 hours prior to surgery.     *To help prevent surgical infections bathe/shower with Dial soap the evening before  surgery.    *You can wear deodorant but no lotion, powder, or perfume/cologne. You should remove all make-up and nail polish at home.    *If you wear glasses, please bring a case for the glasses with you.    *You will be asked to remove dentures and contacts.     *Please leave all valuables at home.    *You should wear loose, comfortable clothing that will accommodate bandages and/or casts.    *You should notify your doctor of any change in your condition (fever, cold, rash, etc). Surgery may need to be re-scheduled until a time you are in better health.    *A responsible adult is required to accompany you to and from the hospital if you are receiving anesthesia or a sedative. Patients are not permitted to drive for 24 hours after anesthesia.     *You can use the [employer] parking if you wish.     *If you have any further questions please call [facility] [phone].                 NPO Instructions:        Additional Instructions:

## 2024-12-05 ENCOUNTER — ANESTHESIA (OUTPATIENT)
Dept: OPERATING ROOM | Facility: HOSPITAL | Age: 29
End: 2024-12-05
Payer: COMMERCIAL

## 2024-12-05 ENCOUNTER — HOSPITAL ENCOUNTER (INPATIENT)
Facility: HOSPITAL | Age: 29
LOS: 2 days | Discharge: HOME | End: 2024-12-07
Attending: SURGERY | Admitting: STUDENT IN AN ORGANIZED HEALTH CARE EDUCATION/TRAINING PROGRAM
Payer: COMMERCIAL

## 2024-12-05 DIAGNOSIS — R60.0 LOCALIZED EDEMA: ICD-10-CM

## 2024-12-05 DIAGNOSIS — E66.01 MORBID (SEVERE) OBESITY DUE TO EXCESS CALORIES (MULTI): Primary | ICD-10-CM

## 2024-12-05 DIAGNOSIS — Z98.84 BARIATRIC SURGERY STATUS: ICD-10-CM

## 2024-12-05 LAB
ABO GROUP (TYPE) IN BLOOD: NORMAL
PREGNANCY TEST URINE, POC: NEGATIVE
RH FACTOR (ANTIGEN D): NORMAL

## 2024-12-05 PROCEDURE — 2500000004 HC RX 250 GENERAL PHARMACY W/ HCPCS (ALT 636 FOR OP/ED): Performed by: ANESTHESIOLOGY

## 2024-12-05 PROCEDURE — 2500000004 HC RX 250 GENERAL PHARMACY W/ HCPCS (ALT 636 FOR OP/ED): Mod: JZ | Performed by: STUDENT IN AN ORGANIZED HEALTH CARE EDUCATION/TRAINING PROGRAM

## 2024-12-05 PROCEDURE — 0D164ZA BYPASS STOMACH TO JEJUNUM, PERCUTANEOUS ENDOSCOPIC APPROACH: ICD-10-PCS | Performed by: SURGERY

## 2024-12-05 PROCEDURE — 3E0T3BZ INTRODUCTION OF ANESTHETIC AGENT INTO PERIPHERAL NERVES AND PLEXI, PERCUTANEOUS APPROACH: ICD-10-PCS | Performed by: SURGERY

## 2024-12-05 PROCEDURE — 2500000005 HC RX 250 GENERAL PHARMACY W/O HCPCS: Performed by: SURGERY

## 2024-12-05 PROCEDURE — 36415 COLL VENOUS BLD VENIPUNCTURE: CPT | Performed by: SURGERY

## 2024-12-05 PROCEDURE — 3600000004 HC OR TIME - INITIAL BASE CHARGE - PROCEDURE LEVEL FOUR: Performed by: SURGERY

## 2024-12-05 PROCEDURE — 2500000004 HC RX 250 GENERAL PHARMACY W/ HCPCS (ALT 636 FOR OP/ED): Performed by: STUDENT IN AN ORGANIZED HEALTH CARE EDUCATION/TRAINING PROGRAM

## 2024-12-05 PROCEDURE — 3600000009 HC OR TIME - EACH INCREMENTAL 1 MINUTE - PROCEDURE LEVEL FOUR: Performed by: SURGERY

## 2024-12-05 PROCEDURE — 2500000004 HC RX 250 GENERAL PHARMACY W/ HCPCS (ALT 636 FOR OP/ED): Performed by: NURSE ANESTHETIST, CERTIFIED REGISTERED

## 2024-12-05 PROCEDURE — 2500000002 HC RX 250 W HCPCS SELF ADMINISTERED DRUGS (ALT 637 FOR MEDICARE OP, ALT 636 FOR OP/ED): Performed by: STUDENT IN AN ORGANIZED HEALTH CARE EDUCATION/TRAINING PROGRAM

## 2024-12-05 PROCEDURE — 81025 URINE PREGNANCY TEST: CPT | Performed by: ANESTHESIOLOGY

## 2024-12-05 PROCEDURE — C1889 IMPLANT/INSERT DEVICE, NOC: HCPCS | Performed by: SURGERY

## 2024-12-05 PROCEDURE — 7100000001 HC RECOVERY ROOM TIME - INITIAL BASE CHARGE: Performed by: SURGERY

## 2024-12-05 PROCEDURE — 43644 LAP GASTRIC BYPASS/ROUX-EN-Y: CPT | Performed by: SURGERY

## 2024-12-05 PROCEDURE — 2720000007 HC OR 272 NO HCPCS: Performed by: SURGERY

## 2024-12-05 PROCEDURE — 96372 THER/PROPH/DIAG INJ SC/IM: CPT | Performed by: STUDENT IN AN ORGANIZED HEALTH CARE EDUCATION/TRAINING PROGRAM

## 2024-12-05 PROCEDURE — 2500000001 HC RX 250 WO HCPCS SELF ADMINISTERED DRUGS (ALT 637 FOR MEDICARE OP): Performed by: STUDENT IN AN ORGANIZED HEALTH CARE EDUCATION/TRAINING PROGRAM

## 2024-12-05 PROCEDURE — 2500000005 HC RX 250 GENERAL PHARMACY W/O HCPCS: Performed by: STUDENT IN AN ORGANIZED HEALTH CARE EDUCATION/TRAINING PROGRAM

## 2024-12-05 PROCEDURE — 3700000002 HC GENERAL ANESTHESIA TIME - EACH INCREMENTAL 1 MINUTE: Performed by: SURGERY

## 2024-12-05 PROCEDURE — 3700000001 HC GENERAL ANESTHESIA TIME - INITIAL BASE CHARGE: Performed by: SURGERY

## 2024-12-05 PROCEDURE — 7100000002 HC RECOVERY ROOM TIME - EACH INCREMENTAL 1 MINUTE: Performed by: SURGERY

## 2024-12-05 PROCEDURE — 2500000004 HC RX 250 GENERAL PHARMACY W/ HCPCS (ALT 636 FOR OP/ED): Performed by: SURGERY

## 2024-12-05 PROCEDURE — 1100000001 HC PRIVATE ROOM DAILY

## 2024-12-05 RX ORDER — PROPOFOL 10 MG/ML
INJECTION, EMULSION INTRAVENOUS AS NEEDED
Status: DISCONTINUED | OUTPATIENT
Start: 2024-12-05 | End: 2024-12-05

## 2024-12-05 RX ORDER — METRONIDAZOLE 500 MG/100ML
500 INJECTION, SOLUTION INTRAVENOUS ONCE
Status: COMPLETED | OUTPATIENT
Start: 2024-12-05 | End: 2024-12-05

## 2024-12-05 RX ORDER — LIDOCAINE HYDROCHLORIDE 40 MG/ML
INJECTION, SOLUTION RETROBULBAR AS NEEDED
Status: DISCONTINUED | OUTPATIENT
Start: 2024-12-05 | End: 2024-12-05

## 2024-12-05 RX ORDER — ALBUTEROL SULFATE 0.83 MG/ML
2.5 SOLUTION RESPIRATORY (INHALATION) ONCE AS NEEDED
Status: DISCONTINUED | OUTPATIENT
Start: 2024-12-05 | End: 2024-12-05 | Stop reason: HOSPADM

## 2024-12-05 RX ORDER — SCOLOPAMINE TRANSDERMAL SYSTEM 1 MG/1
1 PATCH, EXTENDED RELEASE TRANSDERMAL ONCE
Status: DISCONTINUED | OUTPATIENT
Start: 2024-12-05 | End: 2024-12-07 | Stop reason: HOSPADM

## 2024-12-05 RX ORDER — METOCLOPRAMIDE 5 MG/1
10 TABLET ORAL EVERY 6 HOURS PRN
Status: DISCONTINUED | OUTPATIENT
Start: 2024-12-05 | End: 2024-12-07 | Stop reason: HOSPADM

## 2024-12-05 RX ORDER — SUCCINYLCHOLINE CHLORIDE 20 MG/ML
INJECTION INTRAMUSCULAR; INTRAVENOUS AS NEEDED
Status: DISCONTINUED | OUTPATIENT
Start: 2024-12-05 | End: 2024-12-05

## 2024-12-05 RX ORDER — ACETAMINOPHEN 10 MG/ML
1000 INJECTION, SOLUTION INTRAVENOUS EVERY 6 HOURS
Status: COMPLETED | OUTPATIENT
Start: 2024-12-05 | End: 2024-12-06

## 2024-12-05 RX ORDER — MIDAZOLAM HYDROCHLORIDE 1 MG/ML
INJECTION INTRAMUSCULAR; INTRAVENOUS AS NEEDED
Status: DISCONTINUED | OUTPATIENT
Start: 2024-12-05 | End: 2024-12-05

## 2024-12-05 RX ORDER — PHENYLEPHRINE HCL IN 0.9% NACL 0.4MG/10ML
SYRINGE (ML) INTRAVENOUS AS NEEDED
Status: DISCONTINUED | OUTPATIENT
Start: 2024-12-05 | End: 2024-12-05

## 2024-12-05 RX ORDER — ONDANSETRON HYDROCHLORIDE 2 MG/ML
INJECTION, SOLUTION INTRAVENOUS AS NEEDED
Status: DISCONTINUED | OUTPATIENT
Start: 2024-12-05 | End: 2024-12-05

## 2024-12-05 RX ORDER — SODIUM CHLORIDE, SODIUM LACTATE, POTASSIUM CHLORIDE, CALCIUM CHLORIDE 600; 310; 30; 20 MG/100ML; MG/100ML; MG/100ML; MG/100ML
100 INJECTION, SOLUTION INTRAVENOUS CONTINUOUS
Status: DISCONTINUED | OUTPATIENT
Start: 2024-12-05 | End: 2024-12-05 | Stop reason: HOSPADM

## 2024-12-05 RX ORDER — FENTANYL CITRATE 50 UG/ML
INJECTION, SOLUTION INTRAMUSCULAR; INTRAVENOUS AS NEEDED
Status: DISCONTINUED | OUTPATIENT
Start: 2024-12-05 | End: 2024-12-05

## 2024-12-05 RX ORDER — SODIUM CHLORIDE, SODIUM LACTATE, POTASSIUM CHLORIDE, CALCIUM CHLORIDE 600; 310; 30; 20 MG/100ML; MG/100ML; MG/100ML; MG/100ML
20 INJECTION, SOLUTION INTRAVENOUS CONTINUOUS
Status: ACTIVE | OUTPATIENT
Start: 2024-12-05 | End: 2024-12-06

## 2024-12-05 RX ORDER — HEPARIN SODIUM 5000 [USP'U]/ML
5000 INJECTION, SOLUTION INTRAVENOUS; SUBCUTANEOUS ONCE
Status: COMPLETED | OUTPATIENT
Start: 2024-12-05 | End: 2024-12-05

## 2024-12-05 RX ORDER — OXYCODONE HYDROCHLORIDE 5 MG/1
5 TABLET ORAL EVERY 4 HOURS PRN
Status: DISCONTINUED | OUTPATIENT
Start: 2024-12-05 | End: 2024-12-05 | Stop reason: HOSPADM

## 2024-12-05 RX ORDER — KETOROLAC TROMETHAMINE 30 MG/ML
INJECTION, SOLUTION INTRAMUSCULAR; INTRAVENOUS AS NEEDED
Status: DISCONTINUED | OUTPATIENT
Start: 2024-12-05 | End: 2024-12-05

## 2024-12-05 RX ORDER — DROPERIDOL 2.5 MG/ML
0.62 INJECTION, SOLUTION INTRAMUSCULAR; INTRAVENOUS ONCE AS NEEDED
Status: DISCONTINUED | OUTPATIENT
Start: 2024-12-05 | End: 2024-12-05 | Stop reason: HOSPADM

## 2024-12-05 RX ORDER — ONDANSETRON HYDROCHLORIDE 2 MG/ML
4 INJECTION, SOLUTION INTRAVENOUS ONCE AS NEEDED
Status: COMPLETED | OUTPATIENT
Start: 2024-12-05 | End: 2024-12-05

## 2024-12-05 RX ORDER — MEPERIDINE HYDROCHLORIDE 25 MG/ML
12.5 INJECTION INTRAMUSCULAR; INTRAVENOUS; SUBCUTANEOUS EVERY 10 MIN PRN
Status: DISCONTINUED | OUTPATIENT
Start: 2024-12-05 | End: 2024-12-05 | Stop reason: HOSPADM

## 2024-12-05 RX ORDER — ROCURONIUM BROMIDE 10 MG/ML
INJECTION, SOLUTION INTRAVENOUS AS NEEDED
Status: DISCONTINUED | OUTPATIENT
Start: 2024-12-05 | End: 2024-12-05

## 2024-12-05 RX ORDER — HYDROMORPHONE HYDROCHLORIDE 2 MG/ML
INJECTION, SOLUTION INTRAMUSCULAR; INTRAVENOUS; SUBCUTANEOUS AS NEEDED
Status: DISCONTINUED | OUTPATIENT
Start: 2024-12-05 | End: 2024-12-05

## 2024-12-05 RX ORDER — PANTOPRAZOLE SODIUM 40 MG/10ML
40 INJECTION, POWDER, LYOPHILIZED, FOR SOLUTION INTRAVENOUS
Status: DISCONTINUED | OUTPATIENT
Start: 2024-12-06 | End: 2024-12-07 | Stop reason: HOSPADM

## 2024-12-05 RX ORDER — OXYCODONE HCL 5 MG/5 ML
5 SOLUTION, ORAL ORAL EVERY 6 HOURS PRN
Status: DISCONTINUED | OUTPATIENT
Start: 2024-12-05 | End: 2024-12-07 | Stop reason: HOSPADM

## 2024-12-05 RX ORDER — SODIUM CHLORIDE 0.9 G/100ML
IRRIGANT IRRIGATION AS NEEDED
Status: DISCONTINUED | OUTPATIENT
Start: 2024-12-05 | End: 2024-12-05 | Stop reason: HOSPADM

## 2024-12-05 RX ORDER — HEPARIN SODIUM 5000 [USP'U]/ML
5000 INJECTION, SOLUTION INTRAVENOUS; SUBCUTANEOUS EVERY 8 HOURS SCHEDULED
Status: DISCONTINUED | OUTPATIENT
Start: 2024-12-05 | End: 2024-12-07 | Stop reason: HOSPADM

## 2024-12-05 RX ORDER — METOCLOPRAMIDE HYDROCHLORIDE 5 MG/ML
10 INJECTION INTRAMUSCULAR; INTRAVENOUS EVERY 6 HOURS PRN
Status: DISCONTINUED | OUTPATIENT
Start: 2024-12-05 | End: 2024-12-07 | Stop reason: HOSPADM

## 2024-12-05 RX ORDER — APREPITANT 40 MG/1
40 CAPSULE ORAL ONCE
Status: COMPLETED | OUTPATIENT
Start: 2024-12-05 | End: 2024-12-05

## 2024-12-05 RX ORDER — PANTOPRAZOLE SODIUM 40 MG/1
40 TABLET, DELAYED RELEASE ORAL
Status: DISCONTINUED | OUTPATIENT
Start: 2024-12-06 | End: 2024-12-07 | Stop reason: HOSPADM

## 2024-12-05 RX ORDER — SODIUM CHLORIDE, SODIUM LACTATE, POTASSIUM CHLORIDE, CALCIUM CHLORIDE 600; 310; 30; 20 MG/100ML; MG/100ML; MG/100ML; MG/100ML
150 INJECTION, SOLUTION INTRAVENOUS CONTINUOUS
Status: ACTIVE | OUTPATIENT
Start: 2024-12-05 | End: 2024-12-06

## 2024-12-05 RX ORDER — SIMETHICONE 80 MG
80 TABLET,CHEWABLE ORAL EVERY 4 HOURS PRN
Status: DISCONTINUED | OUTPATIENT
Start: 2024-12-05 | End: 2024-12-07 | Stop reason: HOSPADM

## 2024-12-05 RX ORDER — ESOMEPRAZOLE MAGNESIUM 40 MG/1
40 GRANULE, DELAYED RELEASE ORAL
Status: DISCONTINUED | OUTPATIENT
Start: 2024-12-06 | End: 2024-12-07 | Stop reason: HOSPADM

## 2024-12-05 RX ORDER — NALOXONE HYDROCHLORIDE 0.4 MG/ML
0.2 INJECTION, SOLUTION INTRAMUSCULAR; INTRAVENOUS; SUBCUTANEOUS EVERY 5 MIN PRN
Status: DISCONTINUED | OUTPATIENT
Start: 2024-12-05 | End: 2024-12-07 | Stop reason: HOSPADM

## 2024-12-05 RX ORDER — ONDANSETRON 4 MG/1
4 TABLET, ORALLY DISINTEGRATING ORAL EVERY 8 HOURS PRN
Status: DISCONTINUED | OUTPATIENT
Start: 2024-12-05 | End: 2024-12-07 | Stop reason: HOSPADM

## 2024-12-05 RX ORDER — DIPHENHYDRAMINE HYDROCHLORIDE 50 MG/ML
12.5 INJECTION INTRAMUSCULAR; INTRAVENOUS ONCE AS NEEDED
Status: DISCONTINUED | OUTPATIENT
Start: 2024-12-05 | End: 2024-12-05 | Stop reason: HOSPADM

## 2024-12-05 RX ORDER — GABAPENTIN 300 MG/1
600 CAPSULE ORAL ONCE
Status: COMPLETED | OUTPATIENT
Start: 2024-12-05 | End: 2024-12-05

## 2024-12-05 RX ORDER — OXYCODONE HCL 5 MG/5 ML
10 SOLUTION, ORAL ORAL EVERY 4 HOURS PRN
Status: DISCONTINUED | OUTPATIENT
Start: 2024-12-05 | End: 2024-12-07 | Stop reason: HOSPADM

## 2024-12-05 RX ORDER — ONDANSETRON HYDROCHLORIDE 2 MG/ML
4 INJECTION, SOLUTION INTRAVENOUS EVERY 8 HOURS PRN
Status: DISCONTINUED | OUTPATIENT
Start: 2024-12-05 | End: 2024-12-07 | Stop reason: HOSPADM

## 2024-12-05 RX ORDER — HYDRALAZINE HYDROCHLORIDE 20 MG/ML
10 INJECTION INTRAMUSCULAR; INTRAVENOUS EVERY 8 HOURS PRN
Status: DISCONTINUED | OUTPATIENT
Start: 2024-12-05 | End: 2024-12-07 | Stop reason: HOSPADM

## 2024-12-05 RX ORDER — KETOROLAC TROMETHAMINE 15 MG/ML
15 INJECTION, SOLUTION INTRAMUSCULAR; INTRAVENOUS EVERY 6 HOURS SCHEDULED
Status: COMPLETED | OUTPATIENT
Start: 2024-12-05 | End: 2024-12-07

## 2024-12-05 RX ORDER — LIDOCAINE HYDROCHLORIDE 10 MG/ML
INJECTION, SOLUTION EPIDURAL; INFILTRATION; INTRACAUDAL; PERINEURAL AS NEEDED
Status: DISCONTINUED | OUTPATIENT
Start: 2024-12-05 | End: 2024-12-05

## 2024-12-05 RX ADMIN — HEPARIN SODIUM 5000 UNITS: 5000 INJECTION, SOLUTION INTRAVENOUS; SUBCUTANEOUS at 18:09

## 2024-12-05 RX ADMIN — SODIUM CHLORIDE, POTASSIUM CHLORIDE, SODIUM LACTATE AND CALCIUM CHLORIDE 20 ML/HR: 600; 310; 30; 20 INJECTION, SOLUTION INTRAVENOUS at 09:40

## 2024-12-05 RX ADMIN — OXYCODONE HYDROCHLORIDE 5 MG: 5 SOLUTION ORAL at 21:12

## 2024-12-05 RX ADMIN — GABAPENTIN 600 MG: 300 CAPSULE ORAL at 09:57

## 2024-12-05 RX ADMIN — APREPITANT 40 MG: 40 CAPSULE ORAL at 09:57

## 2024-12-05 RX ADMIN — VANCOMYCIN HYDROCHLORIDE 1500 MG: 5 INJECTION, POWDER, LYOPHILIZED, FOR SOLUTION INTRAVENOUS at 10:11

## 2024-12-05 RX ADMIN — SCOPOLAMINE 1 PATCH: 1.5 PATCH, EXTENDED RELEASE TRANSDERMAL at 09:57

## 2024-12-05 RX ADMIN — ACETAMINOPHEN 1000 MG: 10 INJECTION INTRAVENOUS at 21:12

## 2024-12-05 RX ADMIN — KETOROLAC TROMETHAMINE 15 MG: 15 INJECTION, SOLUTION INTRAMUSCULAR; INTRAVENOUS at 18:07

## 2024-12-05 RX ADMIN — ACETAMINOPHEN 1000 MG: 10 INJECTION INTRAVENOUS at 15:50

## 2024-12-05 RX ADMIN — HEPARIN SODIUM 5000 UNITS: 5000 INJECTION, SOLUTION INTRAVENOUS; SUBCUTANEOUS at 09:57

## 2024-12-05 RX ADMIN — ONDANSETRON 4 MG: 2 INJECTION, SOLUTION INTRAMUSCULAR; INTRAVENOUS at 13:36

## 2024-12-05 RX ADMIN — VANCOMYCIN HYDROCHLORIDE 1.5 G: 5 INJECTION, POWDER, LYOPHILIZED, FOR SOLUTION INTRAVENOUS at 22:23

## 2024-12-05 RX ADMIN — HYDROMORPHONE HYDROCHLORIDE 0.25 MG: 0.5 INJECTION, SOLUTION INTRAMUSCULAR; INTRAVENOUS; SUBCUTANEOUS at 13:36

## 2024-12-05 SDOH — SOCIAL STABILITY: SOCIAL INSECURITY
WITHIN THE LAST YEAR, HAVE YOU BEEN RAPED OR FORCED TO HAVE ANY KIND OF SEXUAL ACTIVITY BY YOUR PARTNER OR EX-PARTNER?: NO

## 2024-12-05 SDOH — ECONOMIC STABILITY: FOOD INSECURITY: WITHIN THE PAST 12 MONTHS, YOU WORRIED THAT YOUR FOOD WOULD RUN OUT BEFORE YOU GOT THE MONEY TO BUY MORE.: NEVER TRUE

## 2024-12-05 SDOH — ECONOMIC STABILITY: FOOD INSECURITY: WITHIN THE PAST 12 MONTHS, THE FOOD YOU BOUGHT JUST DIDN'T LAST AND YOU DIDN'T HAVE MONEY TO GET MORE.: NEVER TRUE

## 2024-12-05 SDOH — SOCIAL STABILITY: SOCIAL INSECURITY
WITHIN THE LAST YEAR, HAVE YOU BEEN KICKED, HIT, SLAPPED, OR OTHERWISE PHYSICALLY HURT BY YOUR PARTNER OR EX-PARTNER?: NO

## 2024-12-05 SDOH — SOCIAL STABILITY: SOCIAL INSECURITY: HAVE YOU HAD ANY THOUGHTS OF HARMING ANYONE ELSE?: NO

## 2024-12-05 SDOH — ECONOMIC STABILITY: INCOME INSECURITY: IN THE PAST 12 MONTHS HAS THE ELECTRIC, GAS, OIL, OR WATER COMPANY THREATENED TO SHUT OFF SERVICES IN YOUR HOME?: NO

## 2024-12-05 SDOH — SOCIAL STABILITY: SOCIAL INSECURITY: HAVE YOU HAD THOUGHTS OF HARMING ANYONE ELSE?: NO

## 2024-12-05 SDOH — SOCIAL STABILITY: SOCIAL INSECURITY: WITHIN THE LAST YEAR, HAVE YOU BEEN HUMILIATED OR EMOTIONALLY ABUSED IN OTHER WAYS BY YOUR PARTNER OR EX-PARTNER?: NO

## 2024-12-05 SDOH — SOCIAL STABILITY: SOCIAL INSECURITY: ARE THERE ANY APPARENT SIGNS OF INJURIES/BEHAVIORS THAT COULD BE RELATED TO ABUSE/NEGLECT?: NO

## 2024-12-05 SDOH — SOCIAL STABILITY: SOCIAL INSECURITY: ARE YOU OR HAVE YOU BEEN THREATENED OR ABUSED PHYSICALLY, EMOTIONALLY, OR SEXUALLY BY ANYONE?: NO

## 2024-12-05 SDOH — SOCIAL STABILITY: SOCIAL INSECURITY: WERE YOU ABLE TO COMPLETE ALL THE BEHAVIORAL HEALTH SCREENINGS?: YES

## 2024-12-05 SDOH — SOCIAL STABILITY: SOCIAL INSECURITY: WITHIN THE LAST YEAR, HAVE YOU BEEN AFRAID OF YOUR PARTNER OR EX-PARTNER?: NO

## 2024-12-05 SDOH — SOCIAL STABILITY: SOCIAL INSECURITY: DO YOU FEEL ANYONE HAS EXPLOITED OR TAKEN ADVANTAGE OF YOU FINANCIALLY OR OF YOUR PERSONAL PROPERTY?: NO

## 2024-12-05 SDOH — SOCIAL STABILITY: SOCIAL INSECURITY: ABUSE: ADULT

## 2024-12-05 SDOH — SOCIAL STABILITY: SOCIAL INSECURITY: DOES ANYONE TRY TO KEEP YOU FROM HAVING/CONTACTING OTHER FRIENDS OR DOING THINGS OUTSIDE YOUR HOME?: NO

## 2024-12-05 SDOH — HEALTH STABILITY: MENTAL HEALTH: CURRENT SMOKER: 0

## 2024-12-05 SDOH — SOCIAL STABILITY: SOCIAL INSECURITY: DO YOU FEEL UNSAFE GOING BACK TO THE PLACE WHERE YOU ARE LIVING?: NO

## 2024-12-05 SDOH — SOCIAL STABILITY: SOCIAL INSECURITY: HAS ANYONE EVER THREATENED TO HURT YOUR FAMILY OR YOUR PETS?: NO

## 2024-12-05 ASSESSMENT — PAIN - FUNCTIONAL ASSESSMENT
PAIN_FUNCTIONAL_ASSESSMENT: 0-10
PAIN_FUNCTIONAL_ASSESSMENT: UNABLE TO SELF-REPORT
PAIN_FUNCTIONAL_ASSESSMENT: 0-10

## 2024-12-05 ASSESSMENT — COGNITIVE AND FUNCTIONAL STATUS - GENERAL
PATIENT BASELINE BEDBOUND: NO
DAILY ACTIVITIY SCORE: 24
DAILY ACTIVITIY SCORE: 24
MOBILITY SCORE: 24
MOBILITY SCORE: 24

## 2024-12-05 ASSESSMENT — PATIENT HEALTH QUESTIONNAIRE - PHQ9
SUM OF ALL RESPONSES TO PHQ9 QUESTIONS 1 & 2: 0
2. FEELING DOWN, DEPRESSED OR HOPELESS: NOT AT ALL
1. LITTLE INTEREST OR PLEASURE IN DOING THINGS: NOT AT ALL

## 2024-12-05 ASSESSMENT — COLUMBIA-SUICIDE SEVERITY RATING SCALE - C-SSRS
1. IN THE PAST MONTH, HAVE YOU WISHED YOU WERE DEAD OR WISHED YOU COULD GO TO SLEEP AND NOT WAKE UP?: NO
6. HAVE YOU EVER DONE ANYTHING, STARTED TO DO ANYTHING, OR PREPARED TO DO ANYTHING TO END YOUR LIFE?: NO
2. HAVE YOU ACTUALLY HAD ANY THOUGHTS OF KILLING YOURSELF?: NO

## 2024-12-05 ASSESSMENT — ACTIVITIES OF DAILY LIVING (ADL)
HEARING - LEFT EAR: FUNCTIONAL
FEEDING YOURSELF: INDEPENDENT
WALKS IN HOME: INDEPENDENT
TOILETING: INDEPENDENT
DRESSING YOURSELF: INDEPENDENT
LACK_OF_TRANSPORTATION: NO
GROOMING: INDEPENDENT
ASSISTIVE_DEVICE: EYEGLASSES
JUDGMENT_ADEQUATE_SAFELY_COMPLETE_DAILY_ACTIVITIES: YES
HEARING - RIGHT EAR: FUNCTIONAL
PATIENT'S MEMORY ADEQUATE TO SAFELY COMPLETE DAILY ACTIVITIES?: YES
BATHING: INDEPENDENT
LACK_OF_TRANSPORTATION: NO
ADEQUATE_TO_COMPLETE_ADL: YES

## 2024-12-05 ASSESSMENT — PAIN SCALES - GENERAL
PAINLEVEL_OUTOF10: 4
PAINLEVEL_OUTOF10: 2
PAINLEVEL_OUTOF10: 0 - NO PAIN
PAINLEVEL_OUTOF10: 0 - NO PAIN
PAINLEVEL_OUTOF10: 2
PAINLEVEL_OUTOF10: 0 - NO PAIN
PAINLEVEL_OUTOF10: 2
PAINLEVEL_OUTOF10: 6

## 2024-12-05 ASSESSMENT — LIFESTYLE VARIABLES
HOW OFTEN DO YOU HAVE A DRINK CONTAINING ALCOHOL: NEVER
SKIP TO QUESTIONS 9-10: 1
AUDIT-C TOTAL SCORE: 0
AUDIT-C TOTAL SCORE: 0
HOW OFTEN DO YOU HAVE 6 OR MORE DRINKS ON ONE OCCASION: NEVER
HOW MANY STANDARD DRINKS CONTAINING ALCOHOL DO YOU HAVE ON A TYPICAL DAY: PATIENT DOES NOT DRINK

## 2024-12-05 NOTE — CARE PLAN
The patient's goals for the shift include      The clinical goals for the shift include pain control and tolerate intake      Problem: Pain - Adult  Goal: Verbalizes/displays adequate comfort level or baseline comfort level  Outcome: Progressing     Problem: Safety - Adult  Goal: Free from fall injury  Outcome: Progressing     Problem: Discharge Planning  Goal: Discharge to home or other facility with appropriate resources  Outcome: Progressing

## 2024-12-05 NOTE — ANESTHESIA PROCEDURE NOTES
Airway  Date/Time: 12/5/2024 11:09 AM  Urgency: elective    Airway not difficult    Staffing  Performed: CRNA   Authorized by: Miguel Angel Clark MD    Performed by: KHAI Graf-URBANO  Patient location during procedure: OR    Indications and Patient Condition  Indications for airway management: anesthesia  Spontaneous Ventilation: absent  Sedation level: deep  Preoxygenated: yes  Patient position: sniffing  MILS maintained throughout  Mask difficulty assessment: 1 - vent by mask  Planned trial extubation    Final Airway Details  Final airway type: endotracheal airway      Cuffed: yes   Successful intubation technique: video laryngoscopy (cabral)  Facilitating devices/methods: intubating stylet  Endotracheal tube insertion site: oral  Blade size: #3  Cormack-Lehane Classification: grade I - full view of glottis  Placement verified by: chest auscultation and capnometry   Measured from: teeth  ETT to teeth (cm): 21  Number of attempts at approach: 1  Number of other approaches attempted: 0

## 2024-12-05 NOTE — PROGRESS NOTES
12/05/24 1538   Discharge Planning   Living Arrangements Spouse/significant other   Support Systems Spouse/significant other;Family members;Friends/neighbors   Assistance Needed Patient is A&O X3, on room air at baseline, is independent with ADLs, is able to drive and uses no DME at home. Patient denies further needs upon discharge.   Type of Residence Private residence   Number of Stairs to Enter Residence 6   Number of Stairs Within Residence 0   Do you have animals or pets at home? Yes   Type of Animals or Pets 1 cat 3 dogs a snake and a goat   Who is requesting discharge planning? Provider   Home or Post Acute Services None   Expected Discharge Disposition Home   Does the patient need discharge transport arranged? No   Financial Resource Strain   How hard is it for you to pay for the very basics like food, housing, medical care, and heating? Not hard   Housing Stability   In the last 12 months, was there a time when you were not able to pay the mortgage or rent on time? N   At any time in the past 12 months, were you homeless or living in a shelter (including now)? N   Transportation Needs   In the past 12 months, has lack of transportation kept you from medical appointments or from getting medications? no   In the past 12 months, has lack of transportation kept you from meetings, work, or from getting things needed for daily living? No   Patient Choice   Provider Choice list and CMS website (https://medicare.gov/care-compare#search) for post-acute Quality and Resource Measure Data were provided and reviewed with: Patient   Patient / Family choosing to utilize agency / facility established prior to hospitalization No   Stroke Family Assessment   Stroke Family Assessment Needed No   Intensity of Service   Intensity of Service 0-30 min

## 2024-12-05 NOTE — BRIEF OP NOTE
Date: 2024  OR Location: [facility]    Name: Mia Velazquez, : 1995, Age: 29 y.o., MRN: [id number], Sex: female    Diagnosis  Pre-op Diagnosis      * Bariatric surgery status [Z98.84] Post-op Diagnosis     * Bariatric surgery status [Z98.84]     Procedures  CREATION GASTRIC BYPASS  49287 - SD LAPS GSTR RSTCV PX W/BYP ARIELLA-EN-Y LIMB <150 CM    SD EGD TRANSORAL BIOPSY SINGLE/MULTIPLE [08198]  Surgeons      * Ayaka Mccollum - Primary    Resident/Fellow/Other Assistant:  Surgeons and Role:  * No surgeons found with a matching role *    Staff:   Circulator: Keyon Hollingsworthub Person: Herlinda  Surgical Assistant: Tg Urban Circulator: Ilene Urban Scrub: Arely HANNAA: Marco A    Anesthesia Staff: Anesthesiologist: Miguel Angel Clark MD  CRNA: RAFITA Graf  Frontline Breaker: RAFITA Carr    Procedure Summary  Anesthesia: General  ASA: II  Estimated Blood Loss: 5 mL  Intra-op Medications:   Administrations occurring from 1052 to 1407 on 24:   Medication Name Total Dose   BUPivacaine-EPINEPHrine (Marcaine w/EPI) 30 mL in sodium chloride 0.9% 30 mL syringe 43 mL   sodium chloride 0.9 % irrigation solution 1,000 mL   lactated Ringer's infusion 27.67 mL   metroNIDAZOLE (Flagyl) 500 mg in sodium chloride (iso)  mL 500 mg   dexAMETHasone (Decadron) injection 4 mg/mL 4 mg   fentaNYL (Sublimaze) injection 50 mcg/mL 100 mcg   HYDROmorphone (Dilaudid) injection 2 mg/mL 0.6 mg   ketorolac (Toradol) injection 30 mg 30 mg   lidocaine PF (Xylocaine-MPF) local injection 1 % 50 mg   lidocaine PF (Xylocaine-MPF) local injection 4 % 3 mL   midazolam PF (Versed) injection 1 mg/mL 2 mg   ondansetron (Zofran) 2 mg/mL injection 8 mg   phenylephrine 40 mcg/mL syringe 10 mL 240 mcg   propofol (Diprivan) injection 10 mg/mL 555 mg   rocuronium (ZeMuron) 50 mg/5 mL injection 105 mg   succinylcholine (Anectine) 20 mg/mL injection 200 mg   sugammadex (Bridion) 200 mg/2 mL injection 200 mg            Anesthesia Record                Intraprocedure I/O Totals          Intake    lactated Ringer's infusion 3700.00 mL    Total Intake 3700 mL       Output    Urine 75 mL    Est. Blood Loss 5 mL    Total Output 80 mL       Net    Net Volume 3620 mL          Specimen:   ID Type Source Tests Collected by Time   1 : PERITONEAL MASS ON STOMACH POUCH Tissue MESENTERY SURGICAL PATHOLOGY EXAM Ayaka Mccollum MD MPH 12/5/2024 7024      Findings: Uncomplicated Rayo-en-Y gastric bypass. No complications     Complications:  None; patient tolerated the procedure well.     Disposition: PACU - hemodynamically stable.  Condition: stable    Specimens Collected:   ID Type Source Tests Collected by Time   1 : PERITONEAL MASS ON STOMACH POUCH Tissue MESENTERY SURGICAL PATHOLOGY EXAM Ayaka Mccollum MD MPH 12/5/2024 3663     Attending Attestation: I was present and scrubbed for the entire procedure.    Ayaka Mccollum  Phone Number: [phone]

## 2024-12-05 NOTE — ANESTHESIA POSTPROCEDURE EVALUATION
Patient: Mia Velazquez    Procedure Summary       Date: 12/05/24 Room / Location: [facility] OR 08 / [facility]    Anesthesia Start: 1053 Anesthesia Stop: 1302    Procedure: CREATION GASTRIC BYPASS Diagnosis:       Bariatric surgery status      (Bariatric surgery status [Z98.84])    Surgeons: Ayaka Mccollum MD [facility] Responsible Provider: Miguel Angel Clark MD    Anesthesia Type: general ASA Status: 2            Anesthesia Type: general    Vitals Value Taken Time   /102 12/05/24 1332   Temp 36 12/05/24 1332   Pulse 94 12/05/24 1332   Resp 16 12/05/24 1332   SpO2 94 12/05/24 1332       Anesthesia Post Evaluation    Patient location during evaluation: PACU  Patient participation: complete - patient participated  Level of consciousness: awake and alert  Pain management: adequate  Multimodal analgesia pain management approach  Airway patency: patent  Cardiovascular status: acceptable  Respiratory status: acceptable  Hydration status: acceptable  Postoperative Nausea and Vomiting: none        No notable events documented.

## 2024-12-05 NOTE — ANESTHESIA PREPROCEDURE EVALUATION
Patient: Mia Velazquez    Procedure Information       Date/Time: 12/05/24 1052    Procedure: CREATION GASTRIC BYPASS    Location: [facility] OR 08 / [facility]    Surgeons: Ayaka Mccollum MD [facility]            Relevant Problems   Neuro  pseudoseizures   (+) Epilepsy   (+) Situational anxiety      Liver   (+) Nonalcoholic fatty liver disease      Endocrine   (+) Morbid (severe) obesity due to excess calories (Multi)   (+) Morbid obesity (Multi)      GYN   (+) PCOS (polycystic ovarian syndrome)       Clinical information reviewed:   Tobacco  Allergies  Meds   Med Hx  Surg Hx  OB Status  Fam Hx  Soc   Hx        NPO Detail:  NPO/Void Status  Date of Last Liquid: 12/05/24  Time of Last Liquid: 0650 (gatorade)  Date of Last Solid: 12/03/24         Physical Exam    Airway  Mallampati: II  TM distance: >3 FB  Neck ROM: full     Cardiovascular - normal exam     Dental - normal exam     Pulmonary - normal exam     Abdominal   (+) obese             Anesthesia Plan    History of general anesthesia?: yes  History of complications of general anesthesia?: no    ASA 2     general     The patient is not a current smoker.    intravenous induction   Postoperative administration of opioids is intended.  Trial extubation is planned.  Anesthetic plan and risks discussed with patient.  Use of blood products discussed with patient who consented to blood products.    Plan discussed with CRNA.

## 2024-12-05 NOTE — ANESTHESIA PROCEDURE NOTES
Airway  Date/Time: 12/5/2024 11:05 AM  Urgency: elective    Airway not difficult    Staffing  Performed: CRNA   Authorized by: Miguel Angel Clark MD    Performed by: KHAI Graf-URBANO  Patient location during procedure: OR    Indications and Patient Condition  Indications for airway management: anesthesia  Spontaneous Ventilation: absent  Sedation level: deep  Preoxygenated: yes  Patient position: sniffing  MILS maintained throughout  Mask difficulty assessment: 1 - vent by mask  Planned trial extubation    Final Airway Details  Final airway type: endotracheal airway      Successful airway: ETT  Cuffed: yes   Successful intubation technique: video laryngoscopy (cabral)  Blade size: #3  ETT size (mm): 7.5  Cormack-Lehane Classification: grade I - full view of glottis  Placement verified by: chest auscultation and capnometry   Cuff volume (mL): 8  Measured from: teeth  ETT to teeth (cm): 21  Number of attempts at approach: 1  Number of other approaches attempted: 0

## 2024-12-06 ENCOUNTER — APPOINTMENT (OUTPATIENT)
Dept: RADIOLOGY | Facility: HOSPITAL | Age: 29
End: 2024-12-06
Payer: COMMERCIAL

## 2024-12-06 ENCOUNTER — APPOINTMENT (OUTPATIENT)
Dept: VASCULAR MEDICINE | Facility: HOSPITAL | Age: 29
End: 2024-12-06
Payer: COMMERCIAL

## 2024-12-06 LAB
ALBUMIN SERPL BCP-MCNC: 3.5 G/DL (ref 3.4–5)
ALP SERPL-CCNC: 42 U/L (ref 33–110)
ALT SERPL W P-5'-P-CCNC: 42 U/L (ref 7–45)
ANION GAP SERPL CALC-SCNC: 12 MMOL/L (ref 10–20)
AST SERPL W P-5'-P-CCNC: 18 U/L (ref 9–39)
BACTERIA UR CULT: NORMAL
BASOPHILS # BLD AUTO: 0.02 X10*3/UL (ref 0–0.1)
BASOPHILS NFR BLD AUTO: 0.2 %
BILIRUB SERPL-MCNC: 0.4 MG/DL (ref 0–1.2)
BUN SERPL-MCNC: 4 MG/DL (ref 6–23)
CALCIUM SERPL-MCNC: 8.6 MG/DL (ref 8.6–10.3)
CHLORIDE SERPL-SCNC: 104 MMOL/L (ref 98–107)
CO2 SERPL-SCNC: 26 MMOL/L (ref 21–32)
CREAT SERPL-MCNC: 0.5 MG/DL (ref 0.5–1.05)
EGFRCR SERPLBLD CKD-EPI 2021: >90 ML/MIN/1.73M*2
EOSINOPHIL # BLD AUTO: 0 X10*3/UL (ref 0–0.7)
EOSINOPHIL NFR BLD AUTO: 0 %
ERYTHROCYTE [DISTWIDTH] IN BLOOD BY AUTOMATED COUNT: 12.5 % (ref 11.5–14.5)
GLUCOSE SERPL-MCNC: 88 MG/DL (ref 74–99)
HCT VFR BLD AUTO: 33.1 % (ref 36–46)
HGB BLD-MCNC: 11.2 G/DL (ref 12–16)
IMM GRANULOCYTES # BLD AUTO: 0.03 X10*3/UL (ref 0–0.7)
IMM GRANULOCYTES NFR BLD AUTO: 0.3 % (ref 0–0.9)
LYMPHOCYTES # BLD AUTO: 1.75 X10*3/UL (ref 1.2–4.8)
LYMPHOCYTES NFR BLD AUTO: 18.4 %
MCH RBC QN AUTO: 29.9 PG (ref 26–34)
MCHC RBC AUTO-ENTMCNC: 33.8 G/DL (ref 32–36)
MCV RBC AUTO: 89 FL (ref 80–100)
MONOCYTES # BLD AUTO: 0.76 X10*3/UL (ref 0.1–1)
MONOCYTES NFR BLD AUTO: 8 %
NEUTROPHILS # BLD AUTO: 6.94 X10*3/UL (ref 1.2–7.7)
NEUTROPHILS NFR BLD AUTO: 73.1 %
NRBC BLD-RTO: 0 /100 WBCS (ref 0–0)
PLATELET # BLD AUTO: 273 X10*3/UL (ref 150–450)
POTASSIUM SERPL-SCNC: 3.5 MMOL/L (ref 3.5–5.3)
PROT SERPL-MCNC: 5.8 G/DL (ref 6.4–8.2)
RBC # BLD AUTO: 3.74 X10*6/UL (ref 4–5.2)
SODIUM SERPL-SCNC: 138 MMOL/L (ref 136–145)
WBC # BLD AUTO: 9.5 X10*3/UL (ref 4.4–11.3)

## 2024-12-06 PROCEDURE — 93970 EXTREMITY STUDY: CPT

## 2024-12-06 PROCEDURE — 85025 COMPLETE CBC W/AUTO DIFF WBC: CPT | Performed by: STUDENT IN AN ORGANIZED HEALTH CARE EDUCATION/TRAINING PROGRAM

## 2024-12-06 PROCEDURE — 74220 X-RAY XM ESOPHAGUS 1CNTRST: CPT

## 2024-12-06 PROCEDURE — 2500000004 HC RX 250 GENERAL PHARMACY W/ HCPCS (ALT 636 FOR OP/ED): Performed by: STUDENT IN AN ORGANIZED HEALTH CARE EDUCATION/TRAINING PROGRAM

## 2024-12-06 PROCEDURE — 2500000001 HC RX 250 WO HCPCS SELF ADMINISTERED DRUGS (ALT 637 FOR MEDICARE OP): Performed by: STUDENT IN AN ORGANIZED HEALTH CARE EDUCATION/TRAINING PROGRAM

## 2024-12-06 PROCEDURE — 2550000001 HC RX 255 CONTRASTS: Performed by: STUDENT IN AN ORGANIZED HEALTH CARE EDUCATION/TRAINING PROGRAM

## 2024-12-06 PROCEDURE — 74220 X-RAY XM ESOPHAGUS 1CNTRST: CPT | Performed by: RADIOLOGY

## 2024-12-06 PROCEDURE — 94760 N-INVAS EAR/PLS OXIMETRY 1: CPT

## 2024-12-06 PROCEDURE — 36415 COLL VENOUS BLD VENIPUNCTURE: CPT | Performed by: STUDENT IN AN ORGANIZED HEALTH CARE EDUCATION/TRAINING PROGRAM

## 2024-12-06 PROCEDURE — 80053 COMPREHEN METABOLIC PANEL: CPT | Performed by: STUDENT IN AN ORGANIZED HEALTH CARE EDUCATION/TRAINING PROGRAM

## 2024-12-06 PROCEDURE — 99232 SBSQ HOSP IP/OBS MODERATE 35: CPT | Performed by: STUDENT IN AN ORGANIZED HEALTH CARE EDUCATION/TRAINING PROGRAM

## 2024-12-06 PROCEDURE — 1100000001 HC PRIVATE ROOM DAILY

## 2024-12-06 PROCEDURE — 93970 EXTREMITY STUDY: CPT | Performed by: INTERNAL MEDICINE

## 2024-12-06 RX ORDER — DIATRIZOATE MEGLUMINE AND DIATRIZOATE SODIUM 660; 100 MG/ML; MG/ML
30 SOLUTION ORAL; RECTAL ONCE
Status: COMPLETED | OUTPATIENT
Start: 2024-12-06 | End: 2024-12-06

## 2024-12-06 RX ORDER — SUMATRIPTAN 50 MG/1
50 TABLET, FILM COATED ORAL ONCE
Status: COMPLETED | OUTPATIENT
Start: 2024-12-06 | End: 2024-12-06

## 2024-12-06 RX ADMIN — HEPARIN SODIUM 5000 UNITS: 5000 INJECTION, SOLUTION INTRAVENOUS; SUBCUTANEOUS at 11:37

## 2024-12-06 RX ADMIN — ACETAMINOPHEN 1000 MG: 10 INJECTION INTRAVENOUS at 08:26

## 2024-12-06 RX ADMIN — DIATRIZOATE MEGLUMINE AND DIATRIZOATE SODIUM 30 ML: 660; 100 LIQUID ORAL; RECTAL at 11:17

## 2024-12-06 RX ADMIN — KETOROLAC TROMETHAMINE 15 MG: 15 INJECTION, SOLUTION INTRAMUSCULAR; INTRAVENOUS at 11:37

## 2024-12-06 RX ADMIN — ONDANSETRON 4 MG: 2 INJECTION, SOLUTION INTRAMUSCULAR; INTRAVENOUS at 08:26

## 2024-12-06 RX ADMIN — KETOROLAC TROMETHAMINE 15 MG: 15 INJECTION, SOLUTION INTRAMUSCULAR; INTRAVENOUS at 18:01

## 2024-12-06 RX ADMIN — HEPARIN SODIUM 5000 UNITS: 5000 INJECTION, SOLUTION INTRAVENOUS; SUBCUTANEOUS at 18:01

## 2024-12-06 RX ADMIN — ACETAMINOPHEN 1000 MG: 10 INJECTION INTRAVENOUS at 22:22

## 2024-12-06 RX ADMIN — SODIUM CHLORIDE, POTASSIUM CHLORIDE, SODIUM LACTATE AND CALCIUM CHLORIDE 150 ML/HR: 600; 310; 30; 20 INJECTION, SOLUTION INTRAVENOUS at 08:28

## 2024-12-06 RX ADMIN — ACETAMINOPHEN 1000 MG: 10 INJECTION INTRAVENOUS at 03:03

## 2024-12-06 RX ADMIN — OXYCODONE HYDROCHLORIDE 5 MG: 5 SOLUTION ORAL at 22:21

## 2024-12-06 RX ADMIN — OXYCODONE HYDROCHLORIDE 10 MG: 5 SOLUTION ORAL at 11:26

## 2024-12-06 RX ADMIN — SUMATRIPTAN SUCCINATE 50 MG: 50 TABLET ORAL at 20:30

## 2024-12-06 RX ADMIN — SODIUM CHLORIDE, POTASSIUM CHLORIDE, SODIUM LACTATE AND CALCIUM CHLORIDE 150 ML/HR: 600; 310; 30; 20 INJECTION, SOLUTION INTRAVENOUS at 00:06

## 2024-12-06 RX ADMIN — KETOROLAC TROMETHAMINE 15 MG: 15 INJECTION, SOLUTION INTRAMUSCULAR; INTRAVENOUS at 05:31

## 2024-12-06 RX ADMIN — ACETAMINOPHEN 1000 MG: 10 INJECTION INTRAVENOUS at 14:26

## 2024-12-06 RX ADMIN — KETOROLAC TROMETHAMINE 15 MG: 15 INJECTION, SOLUTION INTRAMUSCULAR; INTRAVENOUS at 00:06

## 2024-12-06 RX ADMIN — PANTOPRAZOLE SODIUM 40 MG: 40 INJECTION, POWDER, FOR SOLUTION INTRAVENOUS at 05:31

## 2024-12-06 RX ADMIN — HEPARIN SODIUM 5000 UNITS: 5000 INJECTION, SOLUTION INTRAVENOUS; SUBCUTANEOUS at 03:03

## 2024-12-06 ASSESSMENT — COGNITIVE AND FUNCTIONAL STATUS - GENERAL
DAILY ACTIVITIY SCORE: 24
MOBILITY SCORE: 24
MOBILITY SCORE: 24
DAILY ACTIVITIY SCORE: 24

## 2024-12-06 ASSESSMENT — PAIN SCALES - GENERAL
PAINLEVEL_OUTOF10: 5 - MODERATE PAIN
PAINLEVEL_OUTOF10: 4
PAINLEVEL_OUTOF10: 7

## 2024-12-06 ASSESSMENT — PAIN - FUNCTIONAL ASSESSMENT: PAIN_FUNCTIONAL_ASSESSMENT: 0-10

## 2024-12-06 ASSESSMENT — ACTIVITIES OF DAILY LIVING (ADL): LACK_OF_TRANSPORTATION: NO

## 2024-12-06 NOTE — CONSULTS
Received consult to review post-op Bariatric diet with pt.     Pt has Bariatric Full Liquid diet handout s/p surgery. States has vitamin and protein drinks at home. Denies further questions or concerns at this time, aware RD available if needs arise, pt instructed to follow up with out patient bariatric dietitian.     Vitals:    12/06/24 0047   Weight: 114 kg (250 lb 14.1 oz)      Body mass index is 41.75 kg/m².     Education Documentation  Bariatric Full Liquid Diet After Weight Loss Surgery, taught by Fatoumata Oliver RDN, LD at 12/6/2024 12:31 PM.  Learner: Family, Patient  Readiness: Acceptance  Method: Explanation, Handout  Response: Verbalizes Understanding

## 2024-12-06 NOTE — PROGRESS NOTES
"Mia Velazquez is a 29 y.o. female on day 1 of admission presenting with Bariatric surgery status.    Subjective   Patient seen this morning.  No acute events overnight.  Pain well-controlled, no nausea, no vomiting, no fever or chills.  Esophagram completed this morning which is essentially unremarkable.     Objective     Physical Exam  Patient is in no acute distress  No respiratory distress  Abdomen is soft, not tender, not distended  No guarding   Incisions are clean dry and intact  Patient is alert and oriented x 3  DIAN drain with serosanguineous fluid    Last Recorded Vitals  Blood pressure 117/79, pulse 87, temperature 37.4 °C (99.3 °F), temperature source Temporal, resp. rate 18, height 1.651 m (5' 5\"), weight 114 kg (250 lb 14.1 oz), SpO2 93%.  Intake/Output last 3 Shifts:  I/O last 3 completed shifts:  In: 6460 (56.8 mL/kg) [P.O.:540; I.V.:5120 (45 mL/kg); IV Piggyback:800]  Out: 3430 (30.1 mL/kg) [Urine:3325 (0.8 mL/kg/hr); Drains:100; Blood:5]  Weight: 113.8 kg     Relevant Results           Assessment/Plan   29-year-old female who is postop day 1 s/p laparoscopic Rayo-en-Y gastric bypass.  Patient is doing well, esophagram showed no leak or obstruction    Plan:  - Advance diet to include full liquids  - Zofran every 6 hours for nausea  - Protonix 40mg IV daily  - Tylenol and oxycodone for pain. Dilaudid as needed for breakthrough pain  - IV fluid support with lactated ringers  - Encourage IS/Ambulation  - Begin discharge planning         I spent 30 minutes in the professional and overall care of this patient.    Boris Posada MD    "

## 2024-12-06 NOTE — PROGRESS NOTES
12/06/24 0926   Discharge Planning   Living Arrangements Spouse/significant other   Support Systems Spouse/significant other;Family members;Friends/neighbors   Assistance Needed Patient is A&O X3, on room air at baseline, is independent with ADLs, is able to drive and uses no DME at home. Patient denies further needs upon discharge.   Type of Residence Private residence   Number of Stairs to Enter Residence 6   Number of Stairs Within Residence 0   Do you have animals or pets at home? Yes   Type of Animals or Pets 1 cat 3 dogs a snake and a goat   Who is requesting discharge planning? Provider   Home or Post Acute Services None   Expected Discharge Disposition Home   Does the patient need discharge transport arranged? No   Financial Resource Strain   How hard is it for you to pay for the very basics like food, housing, medical care, and heating? Not hard   Housing Stability   In the last 12 months, was there a time when you were not able to pay the mortgage or rent on time? N   At any time in the past 12 months, were you homeless or living in a shelter (including now)? N   Transportation Needs   In the past 12 months, has lack of transportation kept you from medical appointments or from getting medications? no   In the past 12 months, has lack of transportation kept you from meetings, work, or from getting things needed for daily living? No

## 2024-12-06 NOTE — CARE PLAN
Problem: Pain - Adult  Goal: Verbalizes/displays adequate comfort level or baseline comfort level  Outcome: Progressing     Problem: Safety - Adult  Goal: Free from fall injury  Outcome: Progressing     Problem: Discharge Planning  Goal: Discharge to home or other facility with appropriate resources  Outcome: Progressing     Problem: Chronic Conditions and Co-morbidities  Goal: Patient's chronic conditions and co-morbidity symptoms are monitored and maintained or improved  Outcome: Progressing   The patient's goals for the shift include      The clinical goals for the shift include pain control and tolerate diet

## 2024-12-07 VITALS
WEIGHT: 251.1 LBS | HEART RATE: 92 BPM | HEIGHT: 65 IN | TEMPERATURE: 98.4 F | SYSTOLIC BLOOD PRESSURE: 129 MMHG | OXYGEN SATURATION: 91 % | BODY MASS INDEX: 41.84 KG/M2 | RESPIRATION RATE: 17 BRPM | DIASTOLIC BLOOD PRESSURE: 89 MMHG

## 2024-12-07 PROCEDURE — 2500000004 HC RX 250 GENERAL PHARMACY W/ HCPCS (ALT 636 FOR OP/ED): Performed by: STUDENT IN AN ORGANIZED HEALTH CARE EDUCATION/TRAINING PROGRAM

## 2024-12-07 PROCEDURE — 2500000001 HC RX 250 WO HCPCS SELF ADMINISTERED DRUGS (ALT 637 FOR MEDICARE OP): Performed by: STUDENT IN AN ORGANIZED HEALTH CARE EDUCATION/TRAINING PROGRAM

## 2024-12-07 PROCEDURE — 2500000005 HC RX 250 GENERAL PHARMACY W/O HCPCS: Performed by: STUDENT IN AN ORGANIZED HEALTH CARE EDUCATION/TRAINING PROGRAM

## 2024-12-07 PROCEDURE — 99239 HOSP IP/OBS DSCHRG MGMT >30: CPT | Performed by: STUDENT IN AN ORGANIZED HEALTH CARE EDUCATION/TRAINING PROGRAM

## 2024-12-07 RX ADMIN — HEPARIN SODIUM 5000 UNITS: 5000 INJECTION, SOLUTION INTRAVENOUS; SUBCUTANEOUS at 01:41

## 2024-12-07 RX ADMIN — KETOROLAC TROMETHAMINE 15 MG: 15 INJECTION, SOLUTION INTRAMUSCULAR; INTRAVENOUS at 00:19

## 2024-12-07 RX ADMIN — OXYCODONE HYDROCHLORIDE 10 MG: 5 SOLUTION ORAL at 09:26

## 2024-12-07 RX ADMIN — ONDANSETRON 4 MG: 4 TABLET, ORALLY DISINTEGRATING ORAL at 07:02

## 2024-12-07 RX ADMIN — HEPARIN SODIUM 5000 UNITS: 5000 INJECTION, SOLUTION INTRAVENOUS; SUBCUTANEOUS at 09:26

## 2024-12-07 ASSESSMENT — COGNITIVE AND FUNCTIONAL STATUS - GENERAL
CLIMB 3 TO 5 STEPS WITH RAILING: A LITTLE
MOBILITY SCORE: 23
DAILY ACTIVITIY SCORE: 24

## 2024-12-07 NOTE — DISCHARGE INSTRUCTIONS
PLEASE NOTE THE FOLLOWING CHANGES IN YOUR MEDICATION REGIMEN:    Medications:  - Medications should be crushed and mixed with full liquids. Capsules may be opened and mixed with full liquids.  - Extended release medications should not be taken. Please contact your primary care physician to convert extended release medications to immediate release form.   - Take 650mg Tylenol with the tylenol you can take tablets (plain white tablets and cut into smaller pieces or crush in applesauce) or Tylenol powder (new product, 500mg per packet, dissolves in the mouth and does not need water every 6 hours for pain. Take between doses of your opioid pain medication. Try to limit the use of your opioid pain medication and only take as needed.  - Slowly add your vitamins to your daily medication regimen as tolerated. You do not have to take them within the first few days after surgery if they are causing you nausea or other problems.   - If you have medications that you still cannot tolerate by your first visit with your surgeon or dietitian, please discuss this.   - You should be receiving or already have received the following medications for your postoperative course: a proton pump inhibitor for acid suppression (omeprazole, esomeprazole, pantoprazole), antinausea medication (ondansetron, metoclopramide), and pain medication (oxycodone, morphine, hydromorphone, hydrocodone). If you are missing any of these, please call the office immediately so we can prescribe them for you.  - OPEN UP OMEPRAZOLE CAPSULES AND SPRINKLE THEM IN WATER PRIOR TO TAKING. IF YOU HAD BARIATRIC SURGERY, YOU WILL CONTINUE THIS MEDICATION FOR AT LEAST 6 MONTHS.      Constipation  - Take fiber (benefiber or metamucil) twice daily. Please also take colace (docusate) twice a day while taking oxycodone or other opiates. If you remain constipated despite these medications, please take milk of magnesia and call the office to notify us.      Activity  instructions:   - No lifting, pulling, or pushing objects greater than 15 pounds for 4 weeks.  - Activity otherwise as tolerated.   - You may shower. Soap and water may run over your incisions. Pat dry. No submerging in baths or  swimming (activities that keep your incisions underwater) for at least two weeks.    - You may not drive while taking narcotics.     Diet:   - You will go home on a full liquid diet (similar to the diet you were on your final day in the hospital)  - You will stay on this full liquid diet for two weeks. Acceptable full liquids include protein shakes, sugar free jello, sugar free pudding, and creamy soups (no chunks). You will continue to drink clear liquids including water and crystal light. You should aim for 64 ounces of fluid per day, with about half of this being full liquids and half of this being clear liquids. Do not exceed 8 oz per hour.  - After two weeks, you should have a discussion with the dietician about progressing to a combination of full liquid and pureed foods.   - Follow a healthy bariatric diet. Target 5 meals per day (3 mid size meals and two small meals). Avoid concentrated sweets (candy, soda) and limit carbohydrate intake with a preference for healthy proteins (lean meats, beans  - For further information, follow the diet instructions listed in your bariatric surgery instruction packet.     Call Provider If:  - Breathing faster or harder than normal.   - Fever of 100.4 F (38 C) or higher or feeling of chills.   - Feeling very sleepy and difficult to awaken.   - Inability to drink or significant decrease in ability to drink since discharge.   - Vomiting (throwing up) and not able to eat or drink for 12 hours.   - Urinating much less than your normal.  - More than 4 loose, watery bowel movements in 24 hours (diarrhea).   - Any new concerning symptoms.

## 2024-12-07 NOTE — DISCHARGE SUMMARY
Discharge Diagnosis  Bariatric surgery status    Issues Requiring Follow-Up  Routine postop follow-up    Test Results Pending At Discharge  Pending Labs       Order Current Status    Surgical Pathology Exam In process          Hospital Course  Patient presented to the hospital for scheduled gastric bypass. The procedure was carried out without complication. No major events POD#0. POD#1 esophogram was negative for signs of leak or obstruction. Diet advanced to maximum of 4 oz per hour without issue. POD#2 diet was advanced to maximum of 8 oz per hour without issue. No other major events. Discharged home on POD#2 once tolerating 5-8 oz of liquid diet per hour. Patient did well post operatively overall.    Pertinent Physical Exam At Time of Discharge  Physical Exam  Patient is in no acute distress  No respiratory distress  Chest is clear to auscultation  Abdomen is soft, not tender, not distended  No guarding   Incisions are clean dry and intact  Patient is alert and oriented x 3  DIAN drain with serosanguineous fluid    Home Medications     Medication List      CONTINUE taking these medications     omeprazole 40 mg DR capsule; Commonly known as: PriLOSEC; Take 1 capsule   (40 mg) by mouth once daily in the morning. Take before meals. Please   remove capsule and use granules and mix with sugar free pudding or jello.   Post-op medication   ondansetron ODT 4 mg disintegrating tablet; Commonly known as:   Zofran-ODT; Dissolve 1 tablet (4 mg) in the mouth every 8 hours if needed   for nausea or vomiting. Post-operative medication   oxyCODONE 5 mg/5 mL solution; Commonly known as: Roxicodone; Take 5 mL   (5 mg) by mouth every 6 hours if needed for severe pain (7 - 10) for up to   5 days. Post-operative medication   Ozempic 1 mg/dose (2 mg/1.5 mL) pen injector; Generic drug: semaglutide   Sprintec (28) 0.25-35 mg-mcg tablet; Generic drug: norgestimate-ethinyl   estradioL       Outpatient Follow-Up  Future Appointments   Date  Time Provider Department [facility]   12/11/2024  1:30 PM Franca Peter RD [id number] [facility]   12/12/2024  8:45 AM KHAI Salcido-CNP [id number] Krishan Posada MD

## 2024-12-07 NOTE — CARE PLAN
The patient's goals for the shift include  pain management     Problem: Pain - Adult  Goal: Verbalizes/displays adequate comfort level or baseline comfort level  Outcome: Progressing     Problem: Safety - Adult  Goal: Free from fall injury  Outcome: Progressing     Problem: Discharge Planning  Goal: Discharge to home or other facility with appropriate resources  Outcome: Progressing     Problem: Chronic Conditions and Co-morbidities  Goal: Patient's chronic conditions and co-morbidity symptoms are monitored and maintained or improved  Outcome: Progressing     The clinical goals for the shift include Patient will have pain managed to allow for at least 5 hours of sleep this shift.

## 2024-12-08 NOTE — OP NOTE
CREATION GASTRIC BYPASS Operative Note     Date: 2024  OR Location: [facility] OR    Name: Mia Velazquez, : 1995, Age: 29 y.o., MRN: [id number], Sex: female    Diagnosis  Pre-op Diagnosis      * Bariatric surgery status [Z98.84] Post-op Diagnosis     * Bariatric surgery status [Z98.84]     Procedures  CREATION GASTRIC BYPASS  64486 - [place] LAPS GSTR RSTCV PX W/BYP ARIELLA-EN-Y LIMB <150 CM    [place] EGD TRANSORAL BIOPSY SINGLE/MULTIPLE [29662]  Surgeons      * Ayaka Mccollum - Primary    Resident/Fellow/Other Assistant:  Surgeons and Role:  * No surgeons found with a matching role *    Staff:   Circulator: Keyon Sam Person: Herlinda  Surgical Assistant: Tg Urban Circulator: Ilene Urban Scrub: Arely HANNAA: Marco A    Anesthesia Staff: Anesthesiologist: Miguel Angel Clark MD  CRNA: RAFITA Graf  Frontline Breaker: RAFITA Carr    Procedure Summary  Anesthesia: General  ASA: II  Estimated Blood Loss: 10 mL  Intra-op Medications:   Administrations occurring from 1052 to 1407 on 24:   Medication Name Total Dose   BUPivacaine-EPINEPHrine (Marcaine w/EPI) 30 mL in sodium chloride 0.9% 30 mL syringe 43 mL   sodium chloride 0.9 % irrigation solution 1,000 mL   lactated Ringer's infusion 27.67 mL   metroNIDAZOLE (Flagyl) 500 mg in sodium chloride (iso)  mL 500 mg   ondansetron (Zofran) injection 4 mg 4 mg   dexAMETHasone (Decadron) injection 4 mg/mL 4 mg   fentaNYL (Sublimaze) injection 50 mcg/mL 100 mcg   HYDROmorphone (Dilaudid) injection 0.25 mg 0.25 mg   HYDROmorphone (Dilaudid) injection 2 mg/mL 0.6 mg   ketorolac (Toradol) injection 30 mg 30 mg   lidocaine PF (Xylocaine-MPF) local injection 1 % 50 mg   lidocaine PF (Xylocaine-MPF) local injection 4 % 3 mL   midazolam PF (Versed) injection 1 mg/mL 2 mg   ondansetron (Zofran) 2 mg/mL injection 8 mg   phenylephrine 40 mcg/mL syringe 10 mL 240 mcg   propofol (Diprivan) injection 10 mg/mL 555 mg   rocuronium (ZeMuron) 50 mg/5 mL injection 105  mg   succinylcholine (Anectine) 20 mg/mL injection 200 mg   sugammadex (Bridion) 200 mg/2 mL injection 200 mg         Intraprocedure I/O Totals       None           Specimen:   ID Type Source Tests Collected by Time   1 : PERITONEAL MASS ON STOMACH POUCH Tissue PERITONEUM EXCISION SURGICAL PATHOLOGY EXAM Ayaka Mccollum MD MPH 12/5/2024 1248                 Drains and/or Catheters:   Closed/Suction Drain 1 LUQ 10 Fr. (Active)   Site Description Bleeding 12/06/24 1950   Dressing Status Clean;Dry 12/06/24 1950   Drainage Appearance Bloody 12/06/24 1950   Status To bulb suction 12/06/24 1950   Output (mL) 30 mL 12/07/24 0602       [REMOVED] Urethral Catheter Non-latex 16 Fr. (Removed)   Site Assessment Skin intact;Clean 12/05/24 1948   Collection Container Standard drainage bag 12/05/24 1948   Securement Method Securing device (Describe) 12/05/24 1948   Reason for Continuing Urinary Catheterization surgical procedures: urological/gynecological, pelvic oncology, anal, prolonged surgical procedure 12/05/24 1948   Output (mL) 1900 mL 12/06/24 0541       Tourniquet Times:         Implants:     Findings: normal anatomy    Indications: Mia Velazquez is an 29 y.o. female who is having surgery for Bariatric surgery status [Z98.84]. Bmi of 42 and multiple comorbidities of obesity, pcos, joint pain, anxiety    The patient was seen in the preoperative area. The risks, benefits, complications, treatment options, non-operative alternatives, expected recovery and outcomes were discussed with the patient. The possibilities of reaction to medication, pulmonary aspiration, injury to surrounding structures, bleeding, recurrent infection, the need for additional procedures, failure to diagnose a condition, and creating a complication requiring transfusion or operation were discussed with the patient. The patient concurred with the proposed plan, giving informed consent.  The site of surgery was properly noted/marked if necessary per  policy. The patient has been actively warmed in preoperative area. Preoperative antibiotics have been ordered and given within 1 hours of incision. Venous thrombosis prophylaxis have been ordered including bilateral sequential compression devices and chemical prophylaxis    Procedure Details:   Procedure Details: Date of procedure 6/20/24      PREOPERATIVE DIAGNOSIS:   Morbid obesity.       POSTOPERATIVE DIAGNOSIS:   Morbid obesity.       OPERATION/PROCEDURE:   Laparoscopic Rayo-en-Y gastric bypass procedure, upper endoscopy,   bilateral transversus abdominis plane blocks.       SURGEON:   Ayaka Mccollum MD, MPH.       ASSISTANT(S):   First assistant is Tg Hawthorne.       ANESTHESIA:   General.       COMPLICATIONS:   None.       SPECIMEN:   None.       INDICATION FOR PROCEDURE:   The patient is a  30 yo female  with a BMI of 42 and   comorbidities of pcos, ness and was taken to   the OR for a gastric bypass procedure.       DESCRIPTION OF PROCEDURE:   The patient was brought to the OR and placed in supine position.   General anesthesia was induced.  She was prepped and draped in the   usual sterile fashion.  Next, incision was made in the left upper   quadrant.  The optical trocar was used to enter the peritoneal cavity   under direct endoscopic visualization.  The abdomen was insufflated   with CO2 to 15 mmHg.  A 10 mm 30-degree scope was inserted.  The   abdomen was inspected.  No gross abnormalities were noted.  We placed   3 additional trocars. A 10 mm trocar was placed 20 cm to the xiphoid   process just left of midline, a 5 mm trocar in the left anterior   axillary line, and a 12 mm trocar 4 fingerbreadths from the   subxiphoid process at the right costal margin.  We began our   dissection along the greater curvature of the stomach and entered   lesser sac using Harmonic scalpel.  We elevated the omentum into the   upper abdomen and identified the ligament of Treitz.  The bowel was   rotated in clockwise  fashion for a distance of 40 cm and stapled and   divided using a white cartridge stapler.  Mesentery was divided down to   its base using the harmonic.  The distal cut end of the bowel was marked using a Penrose.   The Rayo limb was rotated in a counter-clockwise fashion for a   distance of 150 cm.  A 2-0 silk stay suture was placed to hold the limbs of bowel together.  An   enterotomy was made in either limb of bowel, and a 60 mm white cartridge   stapler was used to create a side-to-side functional end-to-end   jejunojejunostomy.  The common enterotomy was held closed using three   2-0 silk stay sutures and stapled closed using a white cartridge   stapler.  We checked anastomosis to make sure it was well closed and   indeed it was.  We closed the mesenteric defect with running 2-0 silk   suture with Lapra-Ty at either end.  The Rayo limb was passed in a   retrocolic antegastric fashion through the bare area of the   transverse mesocolon into the upper abdomen.  The patient was placed   in steep reverse Trendelenburg.      A Radha was placed beneath the left lobe of the liver and held in place using Mediflex retractor,   affixed to the side of the bed.  We dissected along the angle of His down to the base of the left alvina.  We entered the lesser sac 5 cm distal to   the GE junction along the lesser curvature of stomach.  Meticulous hemostasis was maintained.  A gastrotomy was made along the body of   stomach, and the anvil for a 25 EEA stapler was passed through the gastrotomy to the site of future gastrojejunostomy.  The gastrotomy   was staple closed using purple cartridge staplers.  We then created our pouch around our anvil by first firing transversely just below   the anvil, and had multiple vertical firings up toward the angle of His. Blue cartridge staplers reinforced with seamgard were used. Once the pouch and the remnant were completely divided, we checked the   staple lines for any signs of bleeding.   Any bleeding sites were clipped.  We then made an enterotomy in the proximal end of the Rayo   limb.    The EEA was brought through the left upper quadrant incision protected with a wound protector into the proximal end of the Rayo limb.  The spike was brought out on the   antimesenteric side.  Both ends of the EEA and anvil were mated, creating a circular stapled anastomosis.  Both donuts were noted to be well formed and complete.  We then closed the small bowel enterotomy using a blue cartridge stapler, reinforced with SeamGuard. Care was taken to minimize a candycane.  Intraoperative  endoscopy was performed. The esophagus was intubated under direct visualization.  The esophagus was normal. The pouch was entered and we noted a nice 5 cm pouch and widely patent anastomosis. No bleeding was seen endoscopically.  No air leaks were seen.  The pouch was deflated.  The endoscope was removed.      We closed the transverse mesocolon defect with running 2-0 silk suture with A Lapra-Ty at either end.  We inspected the abdomen for any signs of   bleeding or bowel injury.  Meticulous hemostasis was obtained.  A drain was placed through the 5 mm trocar site into the left upper quadrant.  The left upper quadrant incision was closed at the level of the fascia using 0 Vicryl suture, placed in simple fashion using laparoscopic suture passer.  The abdomen was deflated.  Trocars were removed.  Skin was closed at all sites using 4-0 Vicryl suture, placed in subcuticular fashion.  Prineo was placed on the wounds. The patient was awakened from anesthesia and taken to recovery in good condition.           Ayaka Mccollum MD, MPH    Complications:  None; patient tolerated the procedure well.    Disposition: PACU - hemodynamically stable.  Condition: stable         Task Performed by RNFA or Surgical Assistant:  First assist, prep, drape, positioning, hold camera, wound closure, dressings          Additional Details: none    Attending  Attestation: I was present and scrubbed for the entire procedure.    Ayaka Mccollum  Phone Number: [phone]

## 2024-12-09 LAB
ANABASINE UR-MCNC: <5 NG/ML
COTININE UR-MCNC: <15 NG/ML
NICOTINE UR-MCNC: <15 NG/ML
TRANS-3-OH-COTININE UR-MCNC: <50 NG/ML

## 2024-12-09 NOTE — PROGRESS NOTES
BARIATRIC SURGERY CLINIC  1  week FOLLOW UP NOTE  Clinic Date: 12/9/2024  Mia Velazquez, 29 y.o.  [address]  [place] [address]  MRN: [id number]    Index Surgery  Date of Surgery: 12/5/2024  Surgeon Attending: Ayaka Mccollum MD  Surgical Procedure: Laparoscopic reed en y gastric bypass 32778  Initial visit weight: 268 lbs    COMPLICATIONS DURING ADMISSION: None    HPI: 1 week POV s/p RYGB    HISTORY:  Fever/Chills: Denies  Abdominal Pain: 1 stitch at LLQ   Back Pain: Denies  Increased Heart Rate: Denies  Bloating / Hiccups: Denies  Shortness of Breath: Denies  Cough / Wheezing: Denies  Calf/Thigh pain or swelling: Denies  Decreased Urine Output: Denies  Nausea/Vomiting: denies  Diarrhea: Denies  Bowel function: BM this am, WNL    Current Diet: Phase II (Full Liquids)  Daily approximate Fluid intake: 72 fl oz per 24hrs  Protein intake: 62 g  Present Activity level: ambulates w/o problems    Current Medications:   Current Outpatient Medications   Medication Sig Dispense Refill    norgestimate-ethinyl estradioL (Sprintec, 28,) 0.25-35 mg-mcg tablet Take 1 tablet by mouth once daily.      omeprazole (PriLOSEC) 40 mg DR capsule Take 1 capsule (40 mg) by mouth once daily in the morning. Take before meals. Please remove capsule and use granules and mix with sugar free pudding or jello. Post-op medication 30 capsule 1    ondansetron ODT (Zofran-ODT) 4 mg disintegrating tablet Dissolve 1 tablet (4 mg) in the mouth every 8 hours if needed for nausea or vomiting. Post-operative medication 90 tablet 0    semaglutide (Ozempic) 1 mg/dose (2 mg/1.5 mL) pen injector Inject 1 mg under the skin 1 (one) time per week. Taking for pcos       No current facility-administered medications for this visit.       DAILY SUPPLEMENTS:  Calcium: Calcium Citrate w/ vitamin D (1200 - 1500mg) - yes  Multivitamin & Minerals: 2 per day - yes  Iron Supplement: included in multi-vitamin   Vitamin B12: 1000 mcg included in multi-vitamin    Vitamin D3: 3000 units included in multi-vitamin   Other: denies    REVIEW OF SYSTEMS:  CONSTITUTIONAL: Patient denies fevers, chills, sweats and weight changes.  EYES: Patient denies any visual symptoms.  EARS, NOSE, AND THROAT: No difficulties with hearing. No symptoms of rhinitis or sore throat.  CARDIOVASCULAR: Patient denies chest pains, palpitations, orthopnea and paroxysmal nocturnal dyspnea.  RESPIRATORY: No dyspnea on exertion, no wheezing or cough.  GI: No nausea, vomiting, diarrhea, constipation, abdominal pain, hematochezia or melena.  : No urinary hesitancy or dribbling. No nocturia or urinary frequency. No abnormal urethral discharge.  MUSCULOSKELETAL: No myalgias or arthralgias.  NEUROLOGIC: No chronic headaches, no seizures. Patient denies numbness, tingling or weakness.  PSYCHIATRIC: Patient denies problems with mood disturbance. No problems with anxiety.  ENDOCRINE: No excessive urination or excessive thirst.  DERMATOLOGIC: Patient denies any rashes or skin changes.    PHYSICAL EXAM:  PHYSICAL EXAMINATION:[unfilled].  GENERAL: No apparent distress. Pt is alert and oriented x3.  HEENT: Head is normocephalic and atraumatic. Extraocular muscles are intact. Pupils are equal, round, and reactive to light and accommodation. Nares appeared normal. Mouth is well hydrated and without lesions. Mucous membranes are moist. Posterior pharynx clear of any exudate or lesions.  NECK: Supple. No carotid bruits. No lymphadenopathy or thyromegaly.  LUNGS: Clear to auscultation.  HEART: Regular rate and rhythm without murmur.  ABDOMEN: Soft, appropriately mildly tender to palpation, and nondistended. Positive bowel sounds. No hepatosplenomegaly was noted. Incisions CDI.  EXTREMITIES: Without any cyanosis, clubbing, rash, lesions or edema.  NEUROLOGIC: Cranial nerves II through XII are grossly intact.  PSYCHIATRIC: Flat affect, but denies suicidal or homicidal ideations.  SKIN: No ulceration or induration  present.      IMPRESSION: Normal post-OP course. HR 98 Apical per 60 seconds. SpO2 99%. /89. Lungs CTAB, temp 97.5. Patient states that she has stopped taking her pain meds (Roxicodone, Acetaminophen) since Tuesday, 12/10/24. Her pain level is 4-5/10. She also stated that she has not have any water today d/t the fact that she is flying home and did not want to complicate matters for herself. Discussed with the patient the need to stay hydrated and have her pain controlled. Also s/s of infection reviewed.    PLAN:  Activity: Increase activity as tolerated. No heavy lifting > 10 lbs until 2 weeks post op. Increase the duration and frequency of your exercise regimen as you are able.   Your goal is 1 hour/day. Start with walking every day. If it hurts, don't do it.  Return to work: 4 weeks  Diet: Advance diet per Handbook, Education on Pureed (Phase 3) diet and appropriate diet progression provided.  Counseling: Start Vitamins and Omeprazole if you haven't already.   Disposition: Cleared to travel home. Be sure to stop every ONE HOUR on your drive home. Get out of the car and walk around for a few minutes. This will help prevent the formation of blood clots as you travel.  Work your way off the shakes to eventually get all nutrition through food.   Follow up with PCP @ 2 weeks post op. Patient should keep an eye on her heart rate, temp. If HR does not stabilize or any exacerbations happen (s/s of infection, higher HR, SOB, PO intolerance), she should contact her PCP earlier.   Labs to be drawn by PCP    45 minutes spent with patient on face-to-face interaction, history/documentation, education, and coordination of care.

## 2024-12-11 ENCOUNTER — APPOINTMENT (OUTPATIENT)
Dept: SURGERY | Facility: CLINIC | Age: 29
End: 2024-12-11
Payer: COMMERCIAL

## 2024-12-11 ENCOUNTER — TELEPHONE (OUTPATIENT)
Dept: SURGERY | Facility: CLINIC | Age: 29
End: 2024-12-11

## 2024-12-11 VITALS — BODY MASS INDEX: 40.27 KG/M2 | WEIGHT: 242 LBS

## 2024-12-11 NOTE — PROGRESS NOTES
Follow Up Bariatric Nutrition Assessment    Name: Mia Velazquez  MRN: [id number]  Date: 12/11/24    Surgery Date:  12/5/24  Surgeon:  Dr. Mccollum  Procedure:  Rayo-en-y Gastric Bypass    ASSESSMENT:    Current weight:   Vitals:    12/11/24 1328   Weight: 110 kg (242 lb)       BMI: Body mass index is 40.27 kg/m².      Previous weight:  268 lb.  Initial start weight:  268 lb.  EBW: 92 lb.  Total weight change: lost 26 lb.  %EBW Lost: 22%    PROGRESS:    Nutrition Interventions for last encounter (10/24/24):   Drink 64 oz of fluid daily  Drink enough protein shakes to meet your goal of 60-70 g of protein per day  Take 2 chewable multivitamins, 5415-7591 mg of calcium citrate, and 500 mcg of B12 daily  Get up and walk frequently throughout the day.     CHANGES IN TREATMENT:   Patient met goals:  Yes. Pt has been meeting her fluid and protein needs and following the diet stage progression.    24 hour food recall:   Wicron Protein shake  Unsweetened pudding  Protein yogurt smoothie 20g  Wicron Protein shake    Beverages: >64 oz total of protein shake, water, broth, sugar-free popsicles and Jell-o  Alcohol: none      Vitamins:  Bariatric Advantage MVI (contains adequate Vit B12 but no iron) bid, 500 mg Calcium citrate tid    Medications:   Current Outpatient Medications:     norgestimate-ethinyl estradioL (Sprintec, 28,) 0.25-35 mg-mcg tablet, Take 1 tablet by mouth once daily., Disp: , Rfl:     omeprazole (PriLOSEC) 40 mg DR capsule, Take 1 capsule (40 mg) by mouth once daily in the morning. Take before meals. Please remove capsule and use granules and mix with sugar free pudding or jello. Post-op medication, Disp: 30 capsule, Rfl: 1    ondansetron ODT (Zofran-ODT) 4 mg disintegrating tablet, Dissolve 1 tablet (4 mg) in the mouth every 8 hours if needed for nausea or vomiting. Post-operative medication, Disp: 90 tablet, Rfl: 0    semaglutide (Ozempic) 1 mg/dose (2 mg/1.5 mL) pen injector, Inject 1 mg under the skin 1  (one) time per week. Taking for pcos, Disp: , Rfl:     Physical Activity: light walking    READINESS TO LEARN:  Motivation to learn:  Interested      Understanding of instruction: Good    Anticipated Compliance:   Good    Family Support: Unable to assess-family not present     Patient presents with post-op weight loss surgery: gastric bypass. The pt is nearly 1 week post op. Patient has lost 26 pounds since initial assessment accounting for 22% loss excess body weight.  Patient tolerating full liquid diet.  Protein intake is adequate for post-op individual. Fluid consumption is adequate. Patient is not supplementing recommended vitamin/minerals, as she needs an MVI with iron. Pt states no concerns and/or difficulties with nutrition.     Reviewed the puree and soft foods. Reminded pt. to eat slowly, chew thoroughly and to try one new food at a time.  Reviewed the diet stage progression.  Emphasized the need to take supplements for life and follow the 30-30-30 rule for life.  Instructed to switch to an MVI which contains iron.  Offered suggestions.    Malnutrition Screening  Significant unintentional weight loss? n/a  Eating less than 75% of usual intake for more than 2 weeks? n/a      Nutrition Diagnosis:   Increased protein and nutrition needs related to altered GI function as evidenced by pt. s/p sleeve gastrectomy/gastric bypass.    Nutrition Interventions:   Modify type and amount of food and nutrients within meals and snacks.  Comprehensive Nutrition Education  -Nutrition education materials: Nutrition Guidelines for Rayo-en-y Gastric Bypass and Sleeve Gastrectomy, Rules and Reminders, Support Group Schedule        Recommendations:    Continue to drink your protein shakes to meet your goal of 60-70 g of protein per day. Begin measuring how much protein you can eat on the soft diet so that you know when to start weaning off of your protein shakes.   Continue to drink 64 oz. of zero calorie beverages per  day  Continue no drinking 30 min before, during the meal and for 30 minutes after the meal  Continue to exercise   Remain on the full liquid diet for a total of 2 weeks.  Then, advance to the puree diet for 2 weeks, then soft food for 2 weeks  Remember to eat slowly and chew thoroughly  Try one new food at a time to test for any intolerances.   Continue to take all of your vitamins and minerals, switching to an MVI which contains iron.    Nutrition Monitoring and Evaluation:   1-2 pounds weight loss per week  Criteria: weight check, food recall  Need for Follow-up: Pt will be returning to her home out of state and can choose to follow up with a dietitian in her state if desired.

## 2024-12-12 ENCOUNTER — APPOINTMENT (OUTPATIENT)
Dept: SURGERY | Facility: CLINIC | Age: 29
End: 2024-12-12
Payer: COMMERCIAL

## 2024-12-12 VITALS
SYSTOLIC BLOOD PRESSURE: 122 MMHG | TEMPERATURE: 97.5 F | BODY MASS INDEX: 40.32 KG/M2 | OXYGEN SATURATION: 94 % | RESPIRATION RATE: 18 BRPM | HEIGHT: 65 IN | WEIGHT: 242 LBS | DIASTOLIC BLOOD PRESSURE: 89 MMHG | HEART RATE: 101 BPM

## 2024-12-12 DIAGNOSIS — Z98.84 BARIATRIC SURGERY STATUS: Primary | ICD-10-CM

## 2024-12-12 DIAGNOSIS — E66.01 MORBID (SEVERE) OBESITY DUE TO EXCESS CALORIES (MULTI): ICD-10-CM

## 2024-12-12 PROCEDURE — 3008F BODY MASS INDEX DOCD: CPT

## 2024-12-12 PROCEDURE — 99024 POSTOP FOLLOW-UP VISIT: CPT

## 2024-12-12 ASSESSMENT — PAIN SCALES - GENERAL: PAINLEVEL_OUTOF10: 4

## 2024-12-13 NOTE — SIGNIFICANT EVENT
Follow Up Phone Call    Outgoing phone call    Spoke to: Mia Velazquez Relationship:self   Phone number: [phone]      Outcome: I left a message on answering machine   No chief complaint on file.         Diagnosis:Not applicable

## 2024-12-14 NOTE — SIGNIFICANT EVENT
Follow Up Phone Call    Outgoing phone call    Spoke to: Mia Velazquez Relationship:self   Phone number: [phone]      Outcome: contacted patient/ family   No chief complaint on file.         Diagnosis:Not applicable    Reviewed the importance of meeting protein and fluid goals, patient states she is meeting goals. Bowels are moving. Pain is managed. She is ambulating. Denies fever,chills, nausea or vomiting. No further questions or concerns.

## 2024-12-16 LAB
LABORATORY COMMENT REPORT: NORMAL
PATH REPORT.FINAL DX SPEC: NORMAL
PATH REPORT.GROSS SPEC: NORMAL
PATH REPORT.RELEVANT HX SPEC: NORMAL
PATH REPORT.TOTAL CANCER: NORMAL

## (undated) DEVICE — SUTURE, SILK, 2-0, 30 IN, SH, BLACK

## (undated) DEVICE — STAPLER,  ENDO ECHELON 45MM RELOAD, BLUE

## (undated) DEVICE — SOLUTION, INJECTION, USP, SODIUM CHLORIDE 0.9%, .9, NACL, 1000 ML, BAG

## (undated) DEVICE — Device

## (undated) DEVICE — ENDO, CLIP, 5MM, M/L

## (undated) DEVICE — CATHETER, GASTRIC LAVAGE, TUM-E-VAC, SINGLE LUMEN, 32 FR, LF

## (undated) DEVICE — SUTURE, ETHILON, 3-0, 18 IN, PS1, BLACK

## (undated) DEVICE — STAPLER, ECHELON 3000, 60MM LONG

## (undated) DEVICE — ASCOPE, GASTRO, SINGLE-USE

## (undated) DEVICE — STAPLER, LINEAR ENDO RELOAD, 60MM, BLUE, DISP

## (undated) DEVICE — STAPLELINE, BIOABSORB, SEAMGUARD,  45

## (undated) DEVICE — SUTURE, VICRYL, 4-0, 18 IN, PS2, UNDYED

## (undated) DEVICE — ENDO, PORT 5/12 VISIPORT W/FIXIATION CANNULA 176674PF

## (undated) DEVICE — TUBE SET, PNEUMOCLEAR, SMOKE EVACU, HIGH-FLOW

## (undated) DEVICE — STAPLELINE, BIOABSORB, SEAMGUARD, 60

## (undated) DEVICE — STAPLER, ECHELON 3000, 45MM LONG

## (undated) DEVICE — ASSEMBLY, STRYKER FLOW 2, SUCTION IRRIGATOR, WITH TIP

## (undated) DEVICE — STAPLER,  CIRCULAR, MECH XL SEAL, 25MM, DISP

## (undated) DEVICE — APPLICATOR, FOAM BARRIER, LARGE

## (undated) DEVICE — EVACUATOR, WOUND, 100ML HEMOVAC FULL PERF, W/TROCAR 10MM X 20CM

## (undated) DEVICE — TRAY, FOLEY, URINE METER, SURESTEP, 16FR, W/STATLOCK

## (undated) DEVICE — STAPLER, ENDO ECHELON 45MM RELOAD, WHITE, REUSABLE

## (undated) DEVICE — CLIP, ABSORBABLE, VICRYL, LAPRA-TY, VIOLET

## (undated) DEVICE — SCISSOR, MINI ENDO CUT, TIPS, DISP

## (undated) DEVICE — STRIP, SKIN CLOSURE, STERI STRIP, REINFORCED, 0.5 X 4 IN

## (undated) DEVICE — STAPLE, LINE, SEAMGUARD, CEEA 25

## (undated) DEVICE — SUTURE, CTD, VICRYL 3-0, UND, BR, SH-1

## (undated) DEVICE — STAPLER,  LINEAR RELOAD, 60MM, WHITE, DISP

## (undated) DEVICE — CARE KIT, LAPAROSCOPIC, ADVANCED

## (undated) DEVICE — DISSECTOR, ULTRASONIC, SONICISION, CORDLESS, 39CM, CRVD JAW

## (undated) DEVICE — ACCESS SYS, KII SHIELDED BLADED, Z-THREAD, 12X100CM